# Patient Record
Sex: MALE | Race: WHITE | HISPANIC OR LATINO | Employment: FULL TIME | URBAN - METROPOLITAN AREA
[De-identification: names, ages, dates, MRNs, and addresses within clinical notes are randomized per-mention and may not be internally consistent; named-entity substitution may affect disease eponyms.]

---

## 2017-11-23 ENCOUNTER — HOSPITAL ENCOUNTER (INPATIENT)
Facility: MEDICAL CENTER | Age: 22
LOS: 2 days | DRG: 340 | End: 2017-11-25
Attending: EMERGENCY MEDICINE | Admitting: SURGERY

## 2017-11-23 ENCOUNTER — APPOINTMENT (OUTPATIENT)
Dept: RADIOLOGY | Facility: MEDICAL CENTER | Age: 22
DRG: 340 | End: 2017-11-23
Attending: EMERGENCY MEDICINE

## 2017-11-23 DIAGNOSIS — K35.30 ACUTE APPENDICITIS WITH LOCALIZED PERITONITIS: ICD-10-CM

## 2017-11-23 PROBLEM — K35.80 APPENDICITIS, ACUTE: Status: ACTIVE | Noted: 2017-11-23

## 2017-11-23 LAB
ALBUMIN SERPL BCP-MCNC: 4.8 G/DL (ref 3.2–4.9)
ALBUMIN/GLOB SERPL: 1.9 G/DL
ALP SERPL-CCNC: 71 U/L (ref 30–99)
ALT SERPL-CCNC: 65 U/L (ref 2–50)
ANION GAP SERPL CALC-SCNC: 7 MMOL/L (ref 0–11.9)
APPEARANCE UR: CLEAR
AST SERPL-CCNC: 31 U/L (ref 12–45)
BASOPHILS # BLD AUTO: 0.4 % (ref 0–1.8)
BASOPHILS # BLD: 0.07 K/UL (ref 0–0.12)
BILIRUB SERPL-MCNC: 0.8 MG/DL (ref 0.1–1.5)
BILIRUB UR QL STRIP.AUTO: NEGATIVE
BUN SERPL-MCNC: 13 MG/DL (ref 8–22)
CALCIUM SERPL-MCNC: 9.3 MG/DL (ref 8.4–10.2)
CHLORIDE SERPL-SCNC: 102 MMOL/L (ref 96–112)
CO2 SERPL-SCNC: 28 MMOL/L (ref 20–33)
COLOR UR: YELLOW
CREAT SERPL-MCNC: 0.94 MG/DL (ref 0.5–1.4)
CULTURE IF INDICATED INDCX: NO UA CULTURE
EOSINOPHIL # BLD AUTO: 0.03 K/UL (ref 0–0.51)
EOSINOPHIL NFR BLD: 0.2 % (ref 0–6.9)
ERYTHROCYTE [DISTWIDTH] IN BLOOD BY AUTOMATED COUNT: 38.6 FL (ref 35.9–50)
GFR SERPL CREATININE-BSD FRML MDRD: >60 ML/MIN/1.73 M 2
GLOBULIN SER CALC-MCNC: 2.5 G/DL (ref 1.9–3.5)
GLUCOSE SERPL-MCNC: 101 MG/DL (ref 65–99)
GLUCOSE UR STRIP.AUTO-MCNC: NEGATIVE MG/DL
HCT VFR BLD AUTO: 52.1 % (ref 42–52)
HGB BLD-MCNC: 18 G/DL (ref 14–18)
IMM GRANULOCYTES # BLD AUTO: 0.1 K/UL (ref 0–0.11)
IMM GRANULOCYTES NFR BLD AUTO: 0.6 % (ref 0–0.9)
KETONES UR STRIP.AUTO-MCNC: NEGATIVE MG/DL
LEUKOCYTE ESTERASE UR QL STRIP.AUTO: NEGATIVE
LIPASE SERPL-CCNC: 21 U/L (ref 7–58)
LYMPHOCYTES # BLD AUTO: 1.38 K/UL (ref 1–4.8)
LYMPHOCYTES NFR BLD: 7.9 % (ref 22–41)
MCH RBC QN AUTO: 29.8 PG (ref 27–33)
MCHC RBC AUTO-ENTMCNC: 34.5 G/DL (ref 33.7–35.3)
MCV RBC AUTO: 86.3 FL (ref 81.4–97.8)
MICRO URNS: NORMAL
MONOCYTES # BLD AUTO: 0.71 K/UL (ref 0–0.85)
MONOCYTES NFR BLD AUTO: 4 % (ref 0–13.4)
NEUTROPHILS # BLD AUTO: 15.26 K/UL (ref 1.82–7.42)
NEUTROPHILS NFR BLD: 86.9 % (ref 44–72)
NITRITE UR QL STRIP.AUTO: NEGATIVE
NRBC # BLD AUTO: 0 K/UL
NRBC BLD AUTO-RTO: 0 /100 WBC
PH UR STRIP.AUTO: 8 [PH]
PLATELET # BLD AUTO: 266 K/UL (ref 164–446)
PMV BLD AUTO: 8.7 FL (ref 9–12.9)
POTASSIUM SERPL-SCNC: 4.5 MMOL/L (ref 3.6–5.5)
PROT SERPL-MCNC: 7.3 G/DL (ref 6–8.2)
PROT UR QL STRIP: NEGATIVE MG/DL
RBC # BLD AUTO: 6.04 M/UL (ref 4.7–6.1)
RBC UR QL AUTO: NEGATIVE
SODIUM SERPL-SCNC: 137 MMOL/L (ref 135–145)
SP GR UR STRIP.AUTO: 1.01
WBC # BLD AUTO: 17.6 K/UL (ref 4.8–10.8)

## 2017-11-23 PROCEDURE — 700111 HCHG RX REV CODE 636 W/ 250 OVERRIDE (IP)

## 2017-11-23 PROCEDURE — 74177 CT ABD & PELVIS W/CONTRAST: CPT

## 2017-11-23 PROCEDURE — 80053 COMPREHEN METABOLIC PANEL: CPT

## 2017-11-23 PROCEDURE — 96365 THER/PROPH/DIAG IV INF INIT: CPT

## 2017-11-23 PROCEDURE — 700102 HCHG RX REV CODE 250 W/ 637 OVERRIDE(OP): Performed by: SURGERY

## 2017-11-23 PROCEDURE — 770006 HCHG ROOM/CARE - MED/SURG/GYN SEMI*

## 2017-11-23 PROCEDURE — 81003 URINALYSIS AUTO W/O SCOPE: CPT

## 2017-11-23 PROCEDURE — 700117 HCHG RX CONTRAST REV CODE 255: Performed by: EMERGENCY MEDICINE

## 2017-11-23 PROCEDURE — 700105 HCHG RX REV CODE 258: Performed by: EMERGENCY MEDICINE

## 2017-11-23 PROCEDURE — 700105 HCHG RX REV CODE 258

## 2017-11-23 PROCEDURE — 700101 HCHG RX REV CODE 250: Performed by: SURGERY

## 2017-11-23 PROCEDURE — 99285 EMERGENCY DEPT VISIT HI MDM: CPT

## 2017-11-23 PROCEDURE — 83690 ASSAY OF LIPASE: CPT

## 2017-11-23 PROCEDURE — 36415 COLL VENOUS BLD VENIPUNCTURE: CPT

## 2017-11-23 PROCEDURE — A9270 NON-COVERED ITEM OR SERVICE: HCPCS | Performed by: SURGERY

## 2017-11-23 PROCEDURE — 85025 COMPLETE CBC W/AUTO DIFF WBC: CPT

## 2017-11-23 RX ORDER — AMOXICILLIN 250 MG
2 CAPSULE ORAL 2 TIMES DAILY
Status: DISCONTINUED | OUTPATIENT
Start: 2017-11-23 | End: 2017-11-24

## 2017-11-23 RX ORDER — ACETAMINOPHEN 325 MG/1
650 TABLET ORAL EVERY 6 HOURS PRN
Status: DISCONTINUED | OUTPATIENT
Start: 2017-11-23 | End: 2017-11-25 | Stop reason: HOSPADM

## 2017-11-23 RX ORDER — ONDANSETRON 2 MG/ML
4 INJECTION INTRAMUSCULAR; INTRAVENOUS EVERY 4 HOURS PRN
Status: DISCONTINUED | OUTPATIENT
Start: 2017-11-23 | End: 2017-11-25 | Stop reason: HOSPADM

## 2017-11-23 RX ORDER — ONDANSETRON 4 MG/1
4 TABLET, ORALLY DISINTEGRATING ORAL EVERY 4 HOURS PRN
Status: DISCONTINUED | OUTPATIENT
Start: 2017-11-23 | End: 2017-11-25 | Stop reason: HOSPADM

## 2017-11-23 RX ORDER — OXYCODONE HYDROCHLORIDE 5 MG/1
5 TABLET ORAL
Status: DISCONTINUED | OUTPATIENT
Start: 2017-11-23 | End: 2017-11-25 | Stop reason: HOSPADM

## 2017-11-23 RX ORDER — PROMETHAZINE HYDROCHLORIDE 25 MG/1
12.5-25 TABLET ORAL EVERY 4 HOURS PRN
Status: DISCONTINUED | OUTPATIENT
Start: 2017-11-23 | End: 2017-11-24

## 2017-11-23 RX ORDER — POLYETHYLENE GLYCOL 3350 17 G/17G
1 POWDER, FOR SOLUTION ORAL
Status: DISCONTINUED | OUTPATIENT
Start: 2017-11-23 | End: 2017-11-24

## 2017-11-23 RX ORDER — BISACODYL 10 MG
10 SUPPOSITORY, RECTAL RECTAL
Status: DISCONTINUED | OUTPATIENT
Start: 2017-11-23 | End: 2017-11-24

## 2017-11-23 RX ORDER — MORPHINE SULFATE 4 MG/ML
2 INJECTION, SOLUTION INTRAMUSCULAR; INTRAVENOUS
Status: DISCONTINUED | OUTPATIENT
Start: 2017-11-23 | End: 2017-11-25 | Stop reason: HOSPADM

## 2017-11-23 RX ORDER — OXYCODONE HYDROCHLORIDE 5 MG/1
2.5 TABLET ORAL
Status: DISCONTINUED | OUTPATIENT
Start: 2017-11-23 | End: 2017-11-25 | Stop reason: HOSPADM

## 2017-11-23 RX ORDER — SODIUM CHLORIDE 9 MG/ML
1000 INJECTION, SOLUTION INTRAVENOUS ONCE
Status: COMPLETED | OUTPATIENT
Start: 2017-11-23 | End: 2017-11-23

## 2017-11-23 RX ORDER — SODIUM CHLORIDE AND POTASSIUM CHLORIDE 150; 900 MG/100ML; MG/100ML
INJECTION, SOLUTION INTRAVENOUS CONTINUOUS
Status: DISCONTINUED | OUTPATIENT
Start: 2017-11-23 | End: 2017-11-25 | Stop reason: HOSPADM

## 2017-11-23 RX ORDER — PROMETHAZINE HYDROCHLORIDE 25 MG/1
12.5-25 SUPPOSITORY RECTAL EVERY 4 HOURS PRN
Status: DISCONTINUED | OUTPATIENT
Start: 2017-11-23 | End: 2017-11-25 | Stop reason: HOSPADM

## 2017-11-23 RX ADMIN — PIPERACILLIN SODIUM AND TAZOBACTAM SODIUM 4.5 G: 4; .5 INJECTION, POWDER, FOR SOLUTION INTRAVENOUS at 22:41

## 2017-11-23 RX ADMIN — IOHEXOL 100 ML: 350 INJECTION, SOLUTION INTRAVENOUS at 22:18

## 2017-11-23 RX ADMIN — OXYCODONE HYDROCHLORIDE 5 MG: 5 TABLET ORAL at 23:38

## 2017-11-23 RX ADMIN — SODIUM CHLORIDE 1000 ML: 900 INJECTION, SOLUTION INTRAVENOUS at 22:15

## 2017-11-23 RX ADMIN — POTASSIUM CHLORIDE AND SODIUM CHLORIDE: 900; 150 INJECTION, SOLUTION INTRAVENOUS at 23:29

## 2017-11-23 ASSESSMENT — LIFESTYLE VARIABLES
EVER_SMOKED: NEVER
ALCOHOL_USE: NO

## 2017-11-23 ASSESSMENT — PAIN SCALES - GENERAL
PAINLEVEL_OUTOF10: 0
PAINLEVEL_OUTOF10: 4
PAINLEVEL_OUTOF10: 4

## 2017-11-23 ASSESSMENT — PATIENT HEALTH QUESTIONNAIRE - PHQ9
1. LITTLE INTEREST OR PLEASURE IN DOING THINGS: NOT AT ALL
SUM OF ALL RESPONSES TO PHQ QUESTIONS 1-9: 0
2. FEELING DOWN, DEPRESSED, IRRITABLE, OR HOPELESS: NOT AT ALL
SUM OF ALL RESPONSES TO PHQ9 QUESTIONS 1 AND 2: 0

## 2017-11-24 LAB — PATHOLOGY CONSULT NOTE: NORMAL

## 2017-11-24 PROCEDURE — A9270 NON-COVERED ITEM OR SERVICE: HCPCS | Performed by: SURGERY

## 2017-11-24 PROCEDURE — 160028 HCHG SURGERY MINUTES - 1ST 30 MINS LEVEL 3: Performed by: SURGERY

## 2017-11-24 PROCEDURE — 700102 HCHG RX REV CODE 250 W/ 637 OVERRIDE(OP): Performed by: SURGERY

## 2017-11-24 PROCEDURE — A6402 STERILE GAUZE <= 16 SQ IN: HCPCS | Performed by: SURGERY

## 2017-11-24 PROCEDURE — 160009 HCHG ANES TIME/MIN: Performed by: SURGERY

## 2017-11-24 PROCEDURE — 500002 HCHG ADHESIVE, DERMABOND: Performed by: SURGERY

## 2017-11-24 PROCEDURE — 700102 HCHG RX REV CODE 250 W/ 637 OVERRIDE(OP)

## 2017-11-24 PROCEDURE — 0DTJ4ZZ RESECTION OF APPENDIX, PERCUTANEOUS ENDOSCOPIC APPROACH: ICD-10-PCS | Performed by: SURGERY

## 2017-11-24 PROCEDURE — A4314 CATH W/DRAINAGE 2-WAY LATEX: HCPCS | Performed by: SURGERY

## 2017-11-24 PROCEDURE — 160048 HCHG OR STATISTICAL LEVEL 1-5: Performed by: SURGERY

## 2017-11-24 PROCEDURE — 501572 HCHG TROCAR, SHIELD OBTU 5X100: Performed by: SURGERY

## 2017-11-24 PROCEDURE — 700105 HCHG RX REV CODE 258: Performed by: SURGERY

## 2017-11-24 PROCEDURE — 160036 HCHG PACU - EA ADDL 30 MINS PHASE I: Performed by: SURGERY

## 2017-11-24 PROCEDURE — 700101 HCHG RX REV CODE 250

## 2017-11-24 PROCEDURE — 700111 HCHG RX REV CODE 636 W/ 250 OVERRIDE (IP)

## 2017-11-24 PROCEDURE — 502571 HCHG PACK, LAP CHOLE: Performed by: SURGERY

## 2017-11-24 PROCEDURE — 501583 HCHG TROCAR, THRD CAN&SEAL 5X100: Performed by: SURGERY

## 2017-11-24 PROCEDURE — 501399 HCHG SPECIMAN BAG, ENDO CATC: Performed by: SURGERY

## 2017-11-24 PROCEDURE — 700101 HCHG RX REV CODE 250: Performed by: SURGERY

## 2017-11-24 PROCEDURE — 501838 HCHG SUTURE GENERAL: Performed by: SURGERY

## 2017-11-24 PROCEDURE — 160035 HCHG PACU - 1ST 60 MINS PHASE I: Performed by: SURGERY

## 2017-11-24 PROCEDURE — A9270 NON-COVERED ITEM OR SERVICE: HCPCS

## 2017-11-24 PROCEDURE — 160039 HCHG SURGERY MINUTES - EA ADDL 1 MIN LEVEL 3: Performed by: SURGERY

## 2017-11-24 PROCEDURE — 88304 TISSUE EXAM BY PATHOLOGIST: CPT

## 2017-11-24 PROCEDURE — 501568 HCHG TROCAR, BLUNTPORT 12MM: Performed by: SURGERY

## 2017-11-24 PROCEDURE — 770006 HCHG ROOM/CARE - MED/SURG/GYN SEMI*

## 2017-11-24 PROCEDURE — 500800 HCHG LAPAROSCOPIC J/L HOOK: Performed by: SURGERY

## 2017-11-24 PROCEDURE — 160002 HCHG RECOVERY MINUTES (STAT): Performed by: SURGERY

## 2017-11-24 PROCEDURE — 700111 HCHG RX REV CODE 636 W/ 250 OVERRIDE (IP): Performed by: SURGERY

## 2017-11-24 PROCEDURE — 94760 N-INVAS EAR/PLS OXIMETRY 1: CPT

## 2017-11-24 PROCEDURE — 501576 HCHG TROCAR, SMTH CAN&SEAL12: Performed by: SURGERY

## 2017-11-24 RX ORDER — PROMETHAZINE HYDROCHLORIDE 25 MG/1
12.5-25 TABLET ORAL EVERY 4 HOURS PRN
Status: DISCONTINUED | OUTPATIENT
Start: 2017-11-24 | End: 2017-11-25 | Stop reason: HOSPADM

## 2017-11-24 RX ORDER — OXYCODONE HCL 5 MG/5 ML
SOLUTION, ORAL ORAL
Status: COMPLETED
Start: 2017-11-24 | End: 2017-11-24

## 2017-11-24 RX ORDER — DIPHENHYDRAMINE HCL 25 MG
25 TABLET ORAL EVERY 6 HOURS PRN
Status: DISCONTINUED | OUTPATIENT
Start: 2017-11-24 | End: 2017-11-25 | Stop reason: HOSPADM

## 2017-11-24 RX ORDER — POLYETHYLENE GLYCOL 3350 17 G/17G
1 POWDER, FOR SOLUTION ORAL
Status: DISCONTINUED | OUTPATIENT
Start: 2017-11-24 | End: 2017-11-25 | Stop reason: HOSPADM

## 2017-11-24 RX ORDER — SODIUM CHLORIDE, SODIUM LACTATE, POTASSIUM CHLORIDE, CALCIUM CHLORIDE 600; 310; 30; 20 MG/100ML; MG/100ML; MG/100ML; MG/100ML
INJECTION, SOLUTION INTRAVENOUS
Status: DISCONTINUED | OUTPATIENT
Start: 2017-11-24 | End: 2017-11-25 | Stop reason: HOSPADM

## 2017-11-24 RX ORDER — BISACODYL 10 MG
10 SUPPOSITORY, RECTAL RECTAL
Status: DISCONTINUED | OUTPATIENT
Start: 2017-11-24 | End: 2017-11-25 | Stop reason: HOSPADM

## 2017-11-24 RX ORDER — BUPIVACAINE HYDROCHLORIDE AND EPINEPHRINE 5; 5 MG/ML; UG/ML
INJECTION, SOLUTION PERINEURAL
Status: DISCONTINUED | OUTPATIENT
Start: 2017-11-24 | End: 2017-11-24 | Stop reason: HOSPADM

## 2017-11-24 RX ORDER — AMOXICILLIN 250 MG
2 CAPSULE ORAL 2 TIMES DAILY
Status: DISCONTINUED | OUTPATIENT
Start: 2017-11-24 | End: 2017-11-25 | Stop reason: HOSPADM

## 2017-11-24 RX ORDER — MEPERIDINE HYDROCHLORIDE 25 MG/ML
INJECTION INTRAMUSCULAR; INTRAVENOUS; SUBCUTANEOUS
Status: COMPLETED
Start: 2017-11-24 | End: 2017-11-24

## 2017-11-24 RX ORDER — DIPHENHYDRAMINE HYDROCHLORIDE 50 MG/ML
12.5-25 INJECTION INTRAMUSCULAR; INTRAVENOUS EVERY 6 HOURS PRN
Status: DISCONTINUED | OUTPATIENT
Start: 2017-11-24 | End: 2017-11-25 | Stop reason: HOSPADM

## 2017-11-24 RX ADMIN — MEPERIDINE HYDROCHLORIDE 12.5 MG: 25 INJECTION INTRAMUSCULAR; INTRAVENOUS; SUBCUTANEOUS at 09:45

## 2017-11-24 RX ADMIN — FENTANYL CITRATE 25 MCG: 50 INJECTION, SOLUTION INTRAMUSCULAR; INTRAVENOUS at 10:00

## 2017-11-24 RX ADMIN — SODIUM CHLORIDE, SODIUM LACTATE, POTASSIUM CHLORIDE, CALCIUM CHLORIDE: 600; 310; 30; 20 INJECTION, SOLUTION INTRAVENOUS at 08:35

## 2017-11-24 RX ADMIN — FENTANYL CITRATE 25 MCG: 50 INJECTION, SOLUTION INTRAMUSCULAR; INTRAVENOUS at 10:15

## 2017-11-24 RX ADMIN — OXYCODONE HYDROCHLORIDE 5 MG: 5 TABLET ORAL at 19:36

## 2017-11-24 RX ADMIN — PIPERACILLIN SODIUM AND TAZOBACTAM SODIUM 3.38 G: 3; .375 INJECTION, POWDER, FOR SOLUTION INTRAVENOUS at 21:28

## 2017-11-24 RX ADMIN — FENTANYL CITRATE 25 MCG: 50 INJECTION, SOLUTION INTRAMUSCULAR; INTRAVENOUS at 09:51

## 2017-11-24 RX ADMIN — POTASSIUM CHLORIDE AND SODIUM CHLORIDE: 900; 150 INJECTION, SOLUTION INTRAVENOUS at 21:28

## 2017-11-24 RX ADMIN — OXYCODONE HYDROCHLORIDE 5 MG: 5 TABLET ORAL at 15:51

## 2017-11-24 RX ADMIN — FENTANYL CITRATE 25 MCG: 50 INJECTION, SOLUTION INTRAMUSCULAR; INTRAVENOUS at 10:30

## 2017-11-24 RX ADMIN — OXYCODONE HYDROCHLORIDE 10 MG: 5 SOLUTION ORAL at 09:50

## 2017-11-24 RX ADMIN — POTASSIUM CHLORIDE AND SODIUM CHLORIDE: 900; 150 INJECTION, SOLUTION INTRAVENOUS at 06:19

## 2017-11-24 RX ADMIN — PIPERACILLIN SODIUM AND TAZOBACTAM SODIUM 3.38 G: 3; .375 INJECTION, POWDER, FOR SOLUTION INTRAVENOUS at 14:37

## 2017-11-24 RX ADMIN — OXYCODONE HYDROCHLORIDE 5 MG: 5 TABLET ORAL at 12:42

## 2017-11-24 RX ADMIN — PIPERACILLIN SODIUM AND TAZOBACTAM SODIUM 3.38 G: 3; .375 INJECTION, POWDER, FOR SOLUTION INTRAVENOUS at 11:30

## 2017-11-24 ASSESSMENT — COPD QUESTIONNAIRES
DO YOU EVER COUGH UP ANY MUCUS OR PHLEGM?: NO/ONLY WITH OCCASIONAL COLDS OR INFECTIONS
COPD SCREENING SCORE: 0
HAVE YOU SMOKED AT LEAST 100 CIGARETTES IN YOUR ENTIRE LIFE: NO/DON'T KNOW
DURING THE PAST 4 WEEKS HOW MUCH DID YOU FEEL SHORT OF BREATH: NONE/LITTLE OF THE TIME

## 2017-11-24 ASSESSMENT — PAIN SCALES - GENERAL
PAINLEVEL_OUTOF10: 5
PAINLEVEL_OUTOF10: 0
PAINLEVEL_OUTOF10: 5
PAINLEVEL_OUTOF10: 0
PAINLEVEL_OUTOF10: 4
PAINLEVEL_OUTOF10: 3
PAINLEVEL_OUTOF10: 0
PAINLEVEL_OUTOF10: 4

## 2017-11-24 ASSESSMENT — LIFESTYLE VARIABLES: EVER_SMOKED: NEVER

## 2017-11-24 NOTE — OP REPORT
DATE OF SERVICE:  11/24/2017    PREOPERATIVE DIAGNOSIS:  Acute appendicitis.    POSTOPERATIVE DIAGNOSIS:  Acute suppurative nonperforated appendicitis. Localized peritonitis    PROCEDURE PERFORMED:  Laparoscopic appendectomy.    SURGEON:  Kimberly Mayfield MD.    ANESTHESIOLOGIST:  Martin Mendosa MD.    ANESTHESIA:  GET.    FINDINGS:  Inflamed suppurative vermiform appendix.  No sign of perforation.    SPECIMENS:  Appendix.    ESTIMATED BLOOD LOSS:  10 mL.    COMPLICATIONS:  None.    PROCEDURE IN DETAIL:  Patient was consented preoperatively, taken to the   operating room and placed in the supine position.  Anesthesia was induced.  He   was endotracheally intubated without difficulty.  A Dover catheter was   placed.  His abdomen was prepped and draped, and a time-out was performed.  A   2 cm infraumbilical incision was made to place a 12 mm Naomi port under   direct visualization.  Abdomen was then insufflated to 12 mmHg.  A 5 mm   30-degree camera was then placed within the abdomen.  Two 5 mm ports were   placed, one in the suprapubic region, one in the epigastrium with care not to   injure the bladder.  The patient was then airplaned with his left side down.    The cecum was rotated medially exposing the vermiform appendix that was   inflamed and suppurative, but there was no sign of perforation.  The   mesoappendix was divided with the harmonic device exposing the base of the   appendix.  The base of the appendix was transected with an Endo-USHA stapler   using a blue load.  Staple line was inspected, it was intact.  There was no   sign of bleeding.  The appendix was placed in an EndoCatch bag and withdrawn   from the abdomen.  The right lower quadrant was irrigated out copiously until   the irrigant ran clear and hemostasis was confirmed.  The 5 mm ports were   removed under direct visualization.  There was no bleeding from the abdominal   wall.  The abdomen was then desufflated.  The infraumbilical fascia incision    was closed with a figure-of-eight using 0 Vicryl.  All skin incisions were   closed with a subcuticular stitch using 4-0 Monocryl.  Dry gauze dressings   were then applied.  All lap, sponge and instrument counts were correct at the   end of the case x2.  The patient tolerated the procedure well.  He was   awakened from anesthesia, extubated, taken to the postanesthesia care unit in   good condition.       ____________________________________     MD EMIL Davidson / VLADIMIR    DD:  11/24/2017 09:44:40  DT:  11/24/2017 09:56:17    D#:  [id number]  Job#:  [id number]

## 2017-11-24 NOTE — FLOWSHEET NOTE
11/24/17 1437   Interdisciplinary Plan of Care-Goals (Indications)   Hyperinflation Protocol Indications Pre or Post-op Abdominal, Thoracic or Orthopedic Surgery   Incentive Spirometry Group   Incentive Spirometry Instruction Yes   Incentive Spirometer Volume 3000 mL   Chest Exam   Respiration 16   Pulse 99   Breath Sounds   RUL Breath Sounds Clear   RML Breath Sounds Clear   RLL Breath Sounds Clear   QUIRINO Breath Sounds Clear   LLL Breath Sounds Clear   Oxygen   Home O2 Use Prior To Admission? No   Pulse Oximetry 96 %   O2 (LPM) 0   O2 Daily Delivery Respiratory  Room Air with O2 Available

## 2017-11-24 NOTE — OR NURSING
0835 PT TO PACU FOR PRE OP TO ASSUME CARE.  0842 Patient allergies and NPO status verified, home medication reconciliation completed and belongings secured. Patient verbalizes understanding of pain scale, expected course of stay and plan of care. Surgical site verified with patient. IVF established. Sequentials placed on legs

## 2017-11-24 NOTE — OR NURSING
0941-arrived PACU. Arouses to verbal stim. Spontaneous clear resp present as noted. O2@LNC.  VSS dsg CD&I to abd lap stabx3.  Mild red raised rash noted to upper chest area.  Dr. Roach made aware and states rash wash present prior to surgery.  0950- pt medicated for shivering and for pain. Tolerates po flds and oral meds w/o n/v.  Vss.  1000-MD to BS pt updated. O2 weaned to 1LNC. Tolerates well admits some pain relief. Sleeping intermittently.  VSS. Rash to chest remains unchanged. Medicated for c/o mild pain to lower abd.  1045-pt tolerates sips of water w/o n/v pain tolerable at present and dsg CDI. VSS.  Meets criteria for transfer. Report to Cheryl THEODORE.

## 2017-11-24 NOTE — H&P
DATE OF SERVICE:  11/24/2017    HISTORY OF PRESENT ILLNESS:  The patient is a 22-year-old male with 2-day   history of worsening periumbilical pain and nausea.  The patient has no   significant past medical history.  He does have associated diarrhea and   anorexia.  CT abdomen and pelvis showed a thickened appendiceal wall with   periappendiceal stranding, no extraluminal gas or fluid consistent with acute   appendicitis without perforation.    PAST SURGICAL HISTORY:  None.    MEDICATIONS:  None.    ALLERGIES:  No known drug allergies.    SOCIAL HISTORY:  Denies tobacco or illicit drugs, occasional alcohol.    SURGICAL HISTORY:  None.    FAMILY HISTORY:  No history of bleeding, diathesis, or problems with   anesthesia.    PHYSICAL EXAMINATION:  VITAL SIGNS:  Temperature 36.9, pulse 100, blood pressure 136/64, sats 99% on   room air.  GENERAL:  Alert and oriented x3, no apparent distress, nondiaphoretic.  CARDIOVASCULAR:  Regular rate and rhythm.  LUNGS:  Clear to auscultation.  ABDOMEN:  Patient's abdomen is nondistended, soft.  He has tenderness to   palpation in the right lower quadrant with localized rebound and guarding.  SKIN:  No petechia, erythema, or rashes.  NEUROLOGIC:  Cranial nerves II through XII grossly intact.  Normal sensation   and strength in bilateral upper and lower extremities.    LABORATORY DATA:  White count 17, hemoglobin 18, hematocrit 52, and platelets   266.  Sodium 137, potassium 4.5, chloride 102, bicarbonate 28, BUN 13,   creatinine 0.9, AST 31, ALT 65, and total bilirubin 0.8.    ASSESSMENT AND PLAN:  Patient is a 22-year-old male with a history, clinical   exam, and CT findings consistent with acute appendicitis.  Plan is for   laparoscopic appendectomy, possible open.  Risk of surgery were discussed with   the patient including postoperative infection, bleeding, injury to bowel,   possible need for open surgery, postoperative infection, and abscess.  Patient   stated he understands  the risks of surgery and wishes to proceed.       ____________________________________     MD EMIL Davidson / VLADIMIR    DD:  11/24/2017 08:28:54  DT:  11/24/2017 08:57:57    D#:  [id number]  Job#:  [id number]

## 2017-11-24 NOTE — PROGRESS NOTES
"Date & Time:   11/24/2017   8:40 AM        Patient ID:             Name:             Ede Kim     YOB: 1995  Age:                 22 y.o.  male   MRN:               [id number]    _______________    C/o RLQ pain  Acute Appendicitis on CT     Interval Exam:       Vitals:    11/23/17 2219 11/23/17 2237 11/23/17 2300 11/24/17 0800   BP:   136/64 (!) 99/70   Pulse: (!) 105 97 (!) 101 81   Resp:   18 18   Temp:   36.9 °C (98.4 °F) 36.8 °C (98.3 °F)   SpO2: 97% 97% 99% 95%   Weight:   78.6 kg (173 lb 4.5 oz)    Height:   1.753 m (5' 9.02\")      Weight/BMI: Body mass index is 25.58 kg/m².  Pulse Oximetry: 95 %, O2 (LPM): 0, O2 Delivery: None (Room Air)    Intake/Output Summary (Last 24 hours) at 11/24/17 0840  Last data filed at 11/24/17 0600   Gross per 24 hour   Intake              685 ml   Output             1450 ml   Net             -765 ml       Physical Exam  GENERAL:  Alert and oriented x 3. NAD, normal respiratory effort  CV: Regular rate and rhythm, no murmurs. Extremities warm no edema.   Lungs: Clear to auscultation bilaterally.    Abd: Soft, TTP RLQ +rebound    Recent Labs      11/23/17 2133   SODIUM  137   POTASSIUM  4.5   CHLORIDE  102   CO2  28   BUN  13   CREATININE  0.94   CALCIUM  9.3       Recent Labs      11/23/17 2133   ALTSGPT  65*   ASTSGOT  31   ALKPHOSPHAT  71   TBILIRUBIN  0.8   LIPASE  21   GLUCOSE  101*       Recent Labs      11/23/17 2133   RBC  6.04   HEMOGLOBIN  18.0   HEMATOCRIT  52.1*   PLATELETCT  266       Recent Labs      11/23/17 2133   WBC  17.6*   NEUTSPOLYS  86.90*   LYMPHOCYTES  7.90*   MONOCYTES  4.00   EOSINOPHILS  0.20   BASOPHILS  0.40   ASTSGOT  31   ALTSGPT  65*   ALKPHOSPHAT  71   TBILIRUBIN  0.8         ________________________________________________________________________     Current Assessment and Plan:    plan for laparoscopic Appendectomy, possible open  Indications and risks for surgery discussed with patient  Risk of bleeding, " infection, possible need for open surgery, injury to bowel  Patient stated he understands risks/indications for surgery and wishes to proceed.

## 2017-11-24 NOTE — ED PROVIDER NOTES
"ED Provider Note    CHIEF COMPLAINT  Chief Complaint   Patient presents with   • Pelvic Pain   • Painful Urination       HPI  Ede Kim is a 22 y.o. male who presents For evaluation of lower abdominal pain and nausea, going on for 2 days, no fever, no vomiting, he has had some diarrhea with this. He describes an increase in the lower abdominal pain with urination but no burning, no penile discharge, no testicular pain. He denies any recent sexual activity. He states he has no medical problems and at this time offers no other complaints.  He reports the pain is constant, non-radiating.     REVIEW OF SYSTEMS  Negative for fever, rash, chest pain, dyspnea, vomiting, diarrhea, headache, focal weakness, focal numbness, focal tingling, back pain. All other systems are negative.     PAST MEDICAL HISTORY  History reviewed. No pertinent past medical history.    FAMILY HISTORY  History reviewed. No pertinent family history.    SOCIAL HISTORY  Social History   Substance Use Topics   • Smoking status: Never Smoker   • Smokeless tobacco: Never Used   • Alcohol use Yes       SURGICAL HISTORY  History reviewed. No pertinent surgical history.    CURRENT MEDICATIONS  I personally reviewed the medication list in the charting documentation.     ALLERGIES  No Known Allergies    MEDICAL RECORD  I have reviewed patient's medical record and pertinent results are listed above.      PHYSICAL EXAM  VITAL SIGNS: /76   Pulse 90   Temp 36.9 °C (98.5 °F)   Resp 20   Ht 1.753 m (5' 9\")   Wt 78.6 kg (173 lb 4.5 oz)   SpO2 97%   BMI 25.59 kg/m²    Constitutional: Well appearing patient in no acute distress.  Not toxic, nor ill in appearance.  HENT: Mucus membranes moist.    Eyes: No scleral icterus. Normal conjunctiva   Neck: Supple, comfortable, nonpainful range of motion.   Cardiovascular: Regular heart rate and rhythm.   Thorax & Lungs: Chest is nontender.  Lungs are clear to auscultation with good air movement " bilaterally.  No wheeze, rhonchi, nor rales.   Abdomen: Soft,Nondistended, tenderness in the lower abdomen and periumbilically with generalized guarding.  Skin: Warm, dry. No rash appreciated  Extremities/Musculoskeletal: No sign of trauma. No asymmetric calf tenderness, erythema or edema. Normal range of motion   Neurologic: Alert & oriented. No focal deficits observed.   Psychiatric: Normal affect appropriate for the clinical situation.    DIAGNOSTIC STUDIES / PROCEDURES    LABS  Results for orders placed or performed during the hospital encounter of 11/23/17   URINALYSIS,CULTURE IF INDICATED   Result Value Ref Range    Color Yellow     Character Clear     Specific Gravity 1.015 <1.035    Ph 8.0 5.0 - 8.0    Glucose Negative Negative mg/dL    Ketones Negative Negative mg/dL    Protein Negative Negative mg/dL    Bilirubin Negative Negative    Nitrite Negative Negative    Leukocyte Esterase Negative Negative    Occult Blood Negative Negative    Micro Urine Req see below     Culture Indicated No UA Culture   COMP METABOLIC PANEL   Result Value Ref Range    Sodium 137 135 - 145 mmol/L    Potassium 4.5 3.6 - 5.5 mmol/L    Chloride 102 96 - 112 mmol/L    Co2 28 20 - 33 mmol/L    Anion Gap 7.0 0.0 - 11.9    Glucose 101 (H) 65 - 99 mg/dL    Bun 13 8 - 22 mg/dL    Creatinine 0.94 0.50 - 1.40 mg/dL    Calcium 9.3 8.4 - 10.2 mg/dL    AST(SGOT) 31 12 - 45 U/L    ALT(SGPT) 65 (H) 2 - 50 U/L    Alkaline Phosphatase 71 30 - 99 U/L    Total Bilirubin 0.8 0.1 - 1.5 mg/dL    Albumin 4.8 3.2 - 4.9 g/dL    Total Protein 7.3 6.0 - 8.2 g/dL    Globulin 2.5 1.9 - 3.5 g/dL    A-G Ratio 1.9 g/dL   CBC WITH DIFFERENTIAL   Result Value Ref Range    WBC 17.6 (H) 4.8 - 10.8 K/uL    RBC 6.04 4.70 - 6.10 M/uL    Hemoglobin 18.0 14.0 - 18.0 g/dL    Hematocrit 52.1 (H) 42.0 - 52.0 %    MCV 86.3 81.4 - 97.8 fL    MCH 29.8 27.0 - 33.0 pg    MCHC 34.5 33.7 - 35.3 g/dL    RDW 38.6 35.9 - 50.0 fL    Platelet Count 266 164 - 446 K/uL    MPV 8.7 (L) 9.0  - 12.9 fL    Neutrophils-Polys 86.90 (H) 44.00 - 72.00 %    Lymphocytes 7.90 (L) 22.00 - 41.00 %    Monocytes 4.00 0.00 - 13.40 %    Eosinophils 0.20 0.00 - 6.90 %    Basophils 0.40 0.00 - 1.80 %    Immature Granulocytes 0.60 0.00 - 0.90 %    Nucleated RBC 0.00 /100 WBC    Neutrophils (Absolute) 15.26 (H) 1.82 - 7.42 K/uL    Lymphs (Absolute) 1.38 1.00 - 4.80 K/uL    Monos (Absolute) 0.71 0.00 - 0.85 K/uL    Eos (Absolute) 0.03 0.00 - 0.51 K/uL    Baso (Absolute) 0.07 0.00 - 0.12 K/uL    Immature Granulocytes (abs) 0.10 0.00 - 0.11 K/uL    NRBC (Absolute) 0.00 K/uL   LIPASE   Result Value Ref Range    Lipase 21 7 - 58 U/L   ESTIMATED GFR   Result Value Ref Range    GFR If African American >60 >60 mL/min/1.73 m 2    GFR If Non African American >60 >60 mL/min/1.73 m 2           CT-ABDOMEN-PELVIS WITH   Final Result      Acute appendicitis without evidence for rupture and there is no abscess            COURSE & MEDICAL DECISION MAKING  I have reviewed any medical record information, laboratory studies and radiographic results as noted above.    Encounter Summary: This is a 22 y.o. male with abdominal pain, specifically suprapubic, worse with urination but no dysuria or hematuria, no testicular pain, no penile discharge. His vital signs reveal minimal hypertension otherwise within normal limits. On exam he has a tender abdomen with increased tenderness periumbilically and evidence of peritonitis. Initial urinalysis is normal. We'll check blood work. ------ blood work revealed leukocytosis, he remains tender and a CT scan will be obtained to rule out appendicitis ------ CT scan reveals acute appendicitis without any evidence of rupture or abscess, I spoke to Dr. Mayfield, general surgeon who is admitted the patient. I will give a dose of Zosyn.      DISPOSITION: Admitted in guarded condition      FINAL IMPRESSION  1. Acute appendicitis with localized peritonitis           This dictation was created using voice recognition  software. The accuracy of the dictation is limited to the abilities of the software. I expect there may be some errors of grammar and possibly content. The nursing notes were reviewed and certain aspects of this information were incorporated into this note.    Electronically signed by: Abhilash Pink, 11/23/2017 9:12 PM

## 2017-11-24 NOTE — OR SURGEON
Immediate Post OP Note    PreOp Diagnosis: Acute Appendicitis    PostOp Diagnosis: Acute Appendicitis    Procedure(s):  APPENDECTOMY LAPAROSCOPIC - Wound Class: Clean Contaminated    Surgeon(s):  Kimberly Mayfield M.D.    Anesthesiologist/Type of Anesthesia:  Anesthesiologist: Martin Mednosa M.D.  Anesthesia Technician: Emmanuel Malloy/General    Surgical Staff:  Circulator: Gisell Mason R.N.  Scrub Person: Nettie Rothman    Specimens:  Appendix    Estimated Blood Loss: 10cc    Findings: inflamed, suppurative appendix. No sign of perforation    Complications: none    Dictated #[id number]      11/24/2017 9:41 AM Kimberly Mayfield

## 2017-11-25 VITALS
TEMPERATURE: 98.4 F | HEIGHT: 69 IN | SYSTOLIC BLOOD PRESSURE: 123 MMHG | HEART RATE: 73 BPM | DIASTOLIC BLOOD PRESSURE: 58 MMHG | BODY MASS INDEX: 25.67 KG/M2 | RESPIRATION RATE: 16 BRPM | WEIGHT: 173.28 LBS | OXYGEN SATURATION: 97 %

## 2017-11-25 LAB
ERYTHROCYTE [DISTWIDTH] IN BLOOD BY AUTOMATED COUNT: 39.2 FL (ref 35.9–50)
HCT VFR BLD AUTO: 42.1 % (ref 42–52)
HGB BLD-MCNC: 14.5 G/DL (ref 14–18)
MCH RBC QN AUTO: 30 PG (ref 27–33)
MCHC RBC AUTO-ENTMCNC: 34.4 G/DL (ref 33.7–35.3)
MCV RBC AUTO: 87.2 FL (ref 81.4–97.8)
PLATELET # BLD AUTO: 214 K/UL (ref 164–446)
PMV BLD AUTO: 8.8 FL (ref 9–12.9)
RBC # BLD AUTO: 4.83 M/UL (ref 4.7–6.1)
WBC # BLD AUTO: 9.6 K/UL (ref 4.8–10.8)

## 2017-11-25 PROCEDURE — 700105 HCHG RX REV CODE 258: Performed by: SURGERY

## 2017-11-25 PROCEDURE — A9270 NON-COVERED ITEM OR SERVICE: HCPCS | Performed by: SURGERY

## 2017-11-25 PROCEDURE — 700101 HCHG RX REV CODE 250

## 2017-11-25 PROCEDURE — 700111 HCHG RX REV CODE 636 W/ 250 OVERRIDE (IP)

## 2017-11-25 PROCEDURE — 36415 COLL VENOUS BLD VENIPUNCTURE: CPT

## 2017-11-25 PROCEDURE — 700102 HCHG RX REV CODE 250 W/ 637 OVERRIDE(OP): Performed by: SURGERY

## 2017-11-25 PROCEDURE — 85027 COMPLETE CBC AUTOMATED: CPT

## 2017-11-25 PROCEDURE — 700111 HCHG RX REV CODE 636 W/ 250 OVERRIDE (IP): Performed by: SURGERY

## 2017-11-25 RX ORDER — HYDROCODONE BITARTRATE AND ACETAMINOPHEN 7.5; 325 MG/1; MG/1
1-2 TABLET ORAL EVERY 6 HOURS PRN
Qty: 30 TAB | Refills: 0 | Status: SHIPPED | OUTPATIENT
Start: 2017-11-25

## 2017-11-25 RX ORDER — ONDANSETRON 4 MG/1
4 TABLET, ORALLY DISINTEGRATING ORAL EVERY 4 HOURS PRN
Qty: 10 TAB | Refills: 1 | Status: SHIPPED | OUTPATIENT
Start: 2017-11-25 | End: 2019-01-24

## 2017-11-25 RX ADMIN — PIPERACILLIN SODIUM AND TAZOBACTAM SODIUM 3.38 G: 3; .375 INJECTION, POWDER, FOR SOLUTION INTRAVENOUS at 04:54

## 2017-11-25 RX ADMIN — OXYCODONE HYDROCHLORIDE 5 MG: 5 TABLET ORAL at 03:50

## 2017-11-25 ASSESSMENT — ENCOUNTER SYMPTOMS
NAUSEA: 0
FEVER: 0
VOMITING: 0
CHILLS: 0

## 2017-11-25 ASSESSMENT — PAIN SCALES - GENERAL
PAINLEVEL_OUTOF10: 0
PAINLEVEL_OUTOF10: 7
PAINLEVEL_OUTOF10: 5

## 2017-11-25 NOTE — PROGRESS NOTES
Pt returned from having a lap appy.pt aa&o x 4.  3 lap stab sites with drsgs cdi.abd tender.hypo bs.  Iv infusing.  Started on clear liqs.

## 2017-11-25 NOTE — PROGRESS NOTES
Surgical Progress Note    Author: Jeane Castro Date & Time created: 2017   7:38 AM     Interval Events:  Doing well. Pain adequately controlled. Feeling hungry, tolerating clear liquids.   Review of Systems   Constitutional: Negative for chills and fever.   Gastrointestinal: Negative for nausea and vomiting.     Hemodynamics:  Temp (24hrs), Av.5 °C (97.7 °F), Min:36.2 °C (97.2 °F), Max:37.2 °C (98.9 °F)  Temperature: 36.2 °C (97.2 °F)  Pulse  Av  Min: 81  Max: 105Heart Rate (Monitored): 90  Blood Pressure: 107/46, NIBP: 110/61     Respiratory:    Respiration: 16, Pulse Oximetry: 97 %, O2 Daily Delivery Respiratory : Room Air with O2 Available        RUL Breath Sounds: Clear, RML Breath Sounds: Clear, RLL Breath Sounds: Clear, QUIRINO Breath Sounds: Clear, LLL Breath Sounds: Clear  Neuro:  GCS       Fluids:    Intake/Output Summary (Last 24 hours) at 17 0738  Last data filed at 17 0600   Gross per 24 hour   Intake             2740 ml   Output             3575 ml   Net             -835 ml        Current Diet Order   Procedures   • Diet Order     Physical Exam  Labs:  Recent Results (from the past 24 hour(s))   HISTOLOGY REQUEST    Collection Time: 17  9:46 AM   Result Value Ref Range    Pathology Request Sent to [facility]    CBC without Differential    Collection Time: 17  4:36 AM   Result Value Ref Range    WBC 9.6 4.8 - 10.8 K/uL    RBC 4.83 4.70 - 6.10 M/uL    Hemoglobin 14.5 14.0 - 18.0 g/dL    Hematocrit 42.1 42.0 - 52.0 %    MCV 87.2 81.4 - 97.8 fL    MCH 30.0 27.0 - 33.0 pg    MCHC 34.4 33.7 - 35.3 g/dL    RDW 39.2 35.9 - 50.0 fL    Platelet Count 214 164 - 446 K/uL    MPV 8.8 (L) 9.0 - 12.9 fL     Medical Decision Making, by Problem:  Active Hospital Problems    Diagnosis   • Appendicitis, acute [K35.80]     Plan:  Leukocytosis resolved, afebrile.   Advance to regular diet.   Clinically doing well.   D/c home. Discussed post operative care instructions with patient.      Quality Measures:  Quality-Core Measures    Discussed patient condition with Patient

## 2017-11-25 NOTE — DISCHARGE SUMMARY
DATE OF ADMISSION:  11/24/2017    DATE OF DISCHARGE:  11/25/2017    ADMISSION DIAGNOSIS:  Acute appendicitis.    DISCHARGE DIAGNOSIS:  Acute appendicitis.    PROCEDURE:  Laparoscopic appendectomy on 11/24/2017.    HISTORY OF PRESENT ILLNESS:  This is a 22-year-old healthy young man who   presented with 2 days of increasing periumbilical pain and nausea.  He   underwent a CT scan in the [facility] emergency department and was found to have an   acutely inflamed appendix.  He underwent surgical therapy, which he tolerated   well.  He was kept overnight due to his significant leukocytosis of 17, which   resolved by the morning.  Overnight, he progressed to tolerating regular diet,   obtaining good pain control on p.o. pain medications, urinating without a   catheter and ambulating independently and was deemed appropriate for discharge   to home.    DISCHARGE PRESCRIPTIONS:  Zofran 4 mg by mouth every 4 hours as needed for   nausea #10 with 1 refill and Norco 7.5/325 one to two tabs by mouth every 6   hours as needed for pain, #30, no refills.    DISCHARGE INSTRUCTIONS:  He may shower, but should not use bath pools or hot   tubs for the next 2 weeks.  He should not lift more than 20 pounds for the   next 4 weeks.  If he has increasing pain, fevers, chills, nausea, vomiting,   shortness of breath, redness, or oozing around the incisions or any other   questions or concerns, he is to call our office or go to the emergency   department right away.  He is to follow up with Dr. Mayfield in 2 weeks.       ____________________________________     MD HIMANSHU Laws / VLADIMIR    DD:  11/25/2017 07:43:29  DT:  11/25/2017 08:18:10    D#:  [id number]  Job#:  [id number]

## 2017-11-25 NOTE — DISCHARGE INSTRUCTIONS
Laparoscopic Appendectomy D/C instructions:     1. DIET: Upon discharge from the hospital you may resume your normal pre-operative diet. Depending on how you are feeling and whether you have nausea or not, you may wish to stay with a bland diet for the first few days. However, you can advance this as quickly as you feel ready.     2. ACTIVITIES: After discharge from the hospital, you may resume full routine activities. However, there should be no heavy lifting (greater than 15 pounds) and no strenuous activities until after your follow-up visit. Otherwise, routine activities of daily living are acceptable.     3. DRIVING: You may drive whenever you are off pain medications and are able to perform the activities needed to drive, i.e. turning, bending, twisting, etc.     4. BATHING: You may get the wound wet at any time after leaving the hospital. You may shower, but do not submerge in a bath for at least a week. Dressings may come off after 48 hours.     5. BOWEL FUNCTION: A few patients, after this operation, will develop either frequent or loose stools after meals. This usually corrects itself after a few days, to a few weeks. If this occurs, do not worry; it is not unusual and will resolve. Much more common than loose stools, is constipation. The combination of pain medication and decreased activity level can cause constipation in otherwise normal patients. If you feel this is occurring, take a laxative (Milk of Magnesia, Ex-Lax, Senokot, etc.) until the problem has resolved.     6. PAIN MEDICATION: You will be given a prescription for pain medication at discharge. Please take these as directed. It is important to remember not to take medications on an empty stomach as this may cause nausea.     7. CALL IF YOU HAVE: (1) Fevers to more than 1010 F, (2) Unusual chest or leg pain, (3) Drainage or fluid from incision that may be foul smelling, increased tenderness or soreness at the wound or the wound edges are no  longer together, redness or swelling at the incision site. Please do not hesitate to call with any other questions.     8. APPOINTMENT: Contact our office at [phone] for a follow-up appointment in 1 to 2 weeks following your procedure.     If you have any additional questions, please do not hesitate to call the office and speak to either myself or the physician on call.     Office address:   [address]       Jeane Aviles MD   [facility]   [phone]  Discharge Instructions    Discharged to home by car with relative. Discharged via wheelchair, hospital escort: Yes.  Special equipment needed: Not Applicable    Be sure to schedule a follow-up appointment with your primary care doctor or any specialists as instructed.     Discharge Plan:   Influenza Vaccine Indication: Patient Refuses (Refuse)    I understand that a diet low in cholesterol, fat, and sodium is recommended for good health. Unless I have been given specific instructions below for another diet, I accept this instruction as my diet prescription.   Other diet: advance diet as tolerated    Special Instructions: None    · Is patient discharged on Warfarin / Coumadin?   No     · Is patient Post Blood Transfusion?  No    Depression / Suicide Risk    As you are discharged from this [facility] facility, it is important to learn how to keep safe from harming yourself.    Recognize the warning signs:  · Abrupt changes in personality, positive or negative- including increase in energy   · Giving away possessions  · Change in eating patterns- significant weight changes-  positive or negative  · Change in sleeping patterns- unable to sleep or sleeping all the time   · Unwillingness or inability to communicate  · Depression  · Unusual sadness, discouragement and loneliness  · Talk of wanting to die  · Neglect of personal appearance   · Rebelliousness- reckless behavior  · Withdrawal from people/activities they love  · Confusion-  inability to concentrate     If you or a loved one observes any of these behaviors or has concerns about self-harm, here's what you can do:  · Talk about it- your feelings and reasons for harming yourself  · Remove any means that you might use to hurt yourself (examples: pills, rope, extension cords, firearm)  · Get professional help from the community (Mental Health, Substance Abuse, psychological counseling)  · Do not be alone:Call your Safe Contact- someone whom you trust who will be there for you.  · Call your local CRISIS HOTLINE 847-1430 or 936-341-7092  · Call your local Children's Mobile Crisis Response Team [place] (333) 246-1478 or www.[employer]  · Call the toll free National Suicide Prevention Hotlines   · National Suicide Prevention Lifeline 513-740-XGUA (1646)  · National Hope Line Network 800-SUICIDE (419-0223)

## 2017-12-21 NOTE — DOCUMENTATION QUERY
"DOCUMENTATION QUERY    PROVIDERS: Please select “Cosign w/ note” to reply to query.    To better represent the severity of illness of your patient, please review the following information and exercise your independent professional judgment in responding to this query.     Acute appendicitis with localized peritonitis is documented in the ED Report. Acute appendicitis is documented throughout the chart however the localized peritonitis is not.   \"Acute suppurative nonperforated appendicitis\" is also documented.     Based upon the clinical findings, risk factors, and treatment, please clarify the type of Acute Appendicitis     Please select which applies:  • Acute appendicitis without peritonitis  • Acute appendicitis with localized peritonitis  • Acute appendicitis with generalized peritonitis  • Other explanation of clinical findings (please document)  • Unable to determine        The medical record reflects the following:   Clinical Findings   White count 17, hemoglobin 18, hematocrit 52, and platelets   266.  Sodium 137, potassium 4.5, chloride 102, bicarbonate 28, BUN 13, creatinine 0.9, AST 31, ALT 65, and total bilirubin 0.8.   Treatment  Laparoscopic appendectomy   Risk Factors     Location within medical record  Progress Notes   OP Report   Discharge Summary   ED Report     Thank you,   Elizabeth Sawallisch        "

## 2017-12-29 NOTE — DOCUMENTATION QUERY
"DOCUMENTATION QUERY     DR. DYSON, please review this Documentation Query and reply with an addendum. You co-signed only, which does not address the needed clarification from you to be able to code and complete this account.       To better represent the severity of illness of your patient, please review the following information and exercise your independent professional judgment in responding to this query.      Acute appendicitis with localized peritonitis is documented in the ED Report. Acute appendicitis is documented throughout the chart however the localized peritonitis is not.   \"Acute suppurative nonperforated appendicitis\" is also documented.      Based upon the clinical findings, risk factors, and treatment, please clarify the type of Acute Appendicitis                           Please select which applies:  · Acute appendicitis without peritonitis  · Acute appendicitis with localized peritonitis  · Acute appendicitis with generalized peritonitis  · Other explanation of clinical findings (please document)  · Unable to determine           The medical record reflects the following:   Clinical Findings   White count 17, hemoglobin 18, hematocrit 52, and platelets   266.  Sodium 137, potassium 4.5, chloride 102, bicarbonate 28, BUN 13, creatinine 0.9, AST 31, ALT 65, and total bilirubin 0.8.   Treatment  Laparoscopic appendectomy   Risk Factors     Location within medical record  Progress Notes   OP Report   Discharge Summary   ED Report      Thank you,   Elizabeth Sawallisch          "

## 2018-01-02 NOTE — ADDENDUM NOTE
Encounter addended by: Kaila Antony on: 1/2/2018  2:47 PM<BR>    Actions taken: Delete clinical note

## 2019-01-24 ENCOUNTER — HOSPITAL ENCOUNTER (EMERGENCY)
Facility: MEDICAL CENTER | Age: 24
End: 2019-01-24
Attending: EMERGENCY MEDICINE

## 2019-01-24 ENCOUNTER — APPOINTMENT (OUTPATIENT)
Dept: RADIOLOGY | Facility: MEDICAL CENTER | Age: 24
End: 2019-01-24
Attending: EMERGENCY MEDICINE

## 2019-01-24 VITALS
SYSTOLIC BLOOD PRESSURE: 116 MMHG | OXYGEN SATURATION: 97 % | RESPIRATION RATE: 16 BRPM | BODY MASS INDEX: 26.42 KG/M2 | DIASTOLIC BLOOD PRESSURE: 81 MMHG | HEIGHT: 69 IN | WEIGHT: 178.35 LBS | TEMPERATURE: 98.7 F | HEART RATE: 89 BPM

## 2019-01-24 DIAGNOSIS — R11.2 NAUSEA AND VOMITING, INTRACTABILITY OF VOMITING NOT SPECIFIED, UNSPECIFIED VOMITING TYPE: ICD-10-CM

## 2019-01-24 LAB
ALBUMIN SERPL BCP-MCNC: 5.2 G/DL (ref 3.2–4.9)
ALBUMIN/GLOB SERPL: 1.9 G/DL
ALP SERPL-CCNC: 85 U/L (ref 30–99)
ALT SERPL-CCNC: 158 U/L (ref 2–50)
ANION GAP SERPL CALC-SCNC: 11 MMOL/L (ref 0–11.9)
APAP SERPL-MCNC: <10 UG/ML (ref 10–30)
AST SERPL-CCNC: 91 U/L (ref 12–45)
BASOPHILS # BLD AUTO: 0.5 % (ref 0–1.8)
BASOPHILS # BLD: 0.04 K/UL (ref 0–0.12)
BILIRUB SERPL-MCNC: 1.1 MG/DL (ref 0.1–1.5)
BUN SERPL-MCNC: 11 MG/DL (ref 8–22)
CALCIUM SERPL-MCNC: 9.3 MG/DL (ref 8.4–10.2)
CHLORIDE SERPL-SCNC: 105 MMOL/L (ref 96–112)
CO2 SERPL-SCNC: 21 MMOL/L (ref 20–33)
CREAT SERPL-MCNC: 1.08 MG/DL (ref 0.5–1.4)
EOSINOPHIL # BLD AUTO: 0 K/UL (ref 0–0.51)
EOSINOPHIL NFR BLD: 0 % (ref 0–6.9)
ERYTHROCYTE [DISTWIDTH] IN BLOOD BY AUTOMATED COUNT: 38.2 FL (ref 35.9–50)
ETHANOL BLD-MCNC: 0 G/DL
GLOBULIN SER CALC-MCNC: 2.8 G/DL (ref 1.9–3.5)
GLUCOSE SERPL-MCNC: 105 MG/DL (ref 65–99)
HCT VFR BLD AUTO: 50.9 % (ref 42–52)
HGB BLD-MCNC: 17.5 G/DL (ref 14–18)
IMM GRANULOCYTES # BLD AUTO: 0.02 K/UL (ref 0–0.11)
IMM GRANULOCYTES NFR BLD AUTO: 0.2 % (ref 0–0.9)
LIPASE SERPL-CCNC: 26 U/L (ref 7–58)
LYMPHOCYTES # BLD AUTO: 1.37 K/UL (ref 1–4.8)
LYMPHOCYTES NFR BLD: 16.3 % (ref 22–41)
MCH RBC QN AUTO: 29.4 PG (ref 27–33)
MCHC RBC AUTO-ENTMCNC: 34.4 G/DL (ref 33.7–35.3)
MCV RBC AUTO: 85.5 FL (ref 81.4–97.8)
MONOCYTES # BLD AUTO: 0.33 K/UL (ref 0–0.85)
MONOCYTES NFR BLD AUTO: 3.9 % (ref 0–13.4)
NEUTROPHILS # BLD AUTO: 6.62 K/UL (ref 1.82–7.42)
NEUTROPHILS NFR BLD: 79.1 % (ref 44–72)
NRBC # BLD AUTO: 0 K/UL
NRBC BLD-RTO: 0 /100 WBC
PLATELET # BLD AUTO: 274 K/UL (ref 164–446)
PMV BLD AUTO: 8.5 FL (ref 9–12.9)
POTASSIUM SERPL-SCNC: 3.8 MMOL/L (ref 3.6–5.5)
PROT SERPL-MCNC: 8 G/DL (ref 6–8.2)
RBC # BLD AUTO: 5.95 M/UL (ref 4.7–6.1)
SODIUM SERPL-SCNC: 137 MMOL/L (ref 135–145)
WBC # BLD AUTO: 8.4 K/UL (ref 4.8–10.8)

## 2019-01-24 PROCEDURE — 99285 EMERGENCY DEPT VISIT HI MDM: CPT

## 2019-01-24 PROCEDURE — 83690 ASSAY OF LIPASE: CPT

## 2019-01-24 PROCEDURE — 80307 DRUG TEST PRSMV CHEM ANLYZR: CPT

## 2019-01-24 PROCEDURE — 80053 COMPREHEN METABOLIC PANEL: CPT

## 2019-01-24 PROCEDURE — 85025 COMPLETE CBC W/AUTO DIFF WBC: CPT

## 2019-01-24 PROCEDURE — 76705 ECHO EXAM OF ABDOMEN: CPT

## 2019-01-24 PROCEDURE — 36415 COLL VENOUS BLD VENIPUNCTURE: CPT

## 2019-01-24 RX ORDER — ONDANSETRON 4 MG/1
4 TABLET, ORALLY DISINTEGRATING ORAL EVERY 8 HOURS PRN
Qty: 10 TAB | Refills: 0 | Status: SHIPPED | OUTPATIENT
Start: 2019-01-24

## 2019-01-24 RX ORDER — OMEPRAZOLE 20 MG/1
20 CAPSULE, DELAYED RELEASE ORAL DAILY
Qty: 30 CAP | Refills: 0 | Status: SHIPPED | OUTPATIENT
Start: 2019-01-24

## 2019-01-24 NOTE — ED NOTES
Patient brought immediately back to room. ED Behavioral Health Patient Room Checklist completed by RN and ED tech. 1:1 observation in place.

## 2019-01-24 NOTE — DISCHARGE PLANNING
SW met with patient beside and provided this patient with the consoling /psych resource list.  SW assisted patient in calling [facility].  Patient completed the phone screening and was told that they would be contacting him to make an appointment. KATHARINE also provided this patient with the [facility] information.

## 2019-01-24 NOTE — CONSULTS
"[facility]   TRIAGE ASSESSMENT    Name: Ede Kim  MRN: [id number]  : 1995  Age: 23 y.o.  Date of assessment: 2019  PCP: Pcp Pt States None  Persons in attendance: Patient    CHIEF COMPLAINT/PRESENTING ISSUE (as stated by patient): 23 year old male patient BIB self for GI problem/vomiting and verbalized SI during [place] SSRS; pt alert, oriented x 4; calm; cooperative; organized thoughts and behaviors; no delusions, paranoia, hallucinations present; states he is having \"mental health issues, depression, for a lot of years and suicidal thoughts for 7 years, thoughts including lying in bed and getting a kitchen knife to stab self, take off seatbelt and crash car\" with a thought to stab self yesterday; denies plan today; denies h/o self-harmful, or suicidal behaviors; denies family h/o SA; denies current, or h/o oupt or inpt MH or CD treatment; states he is willing to accept help but he does not want anyone to know and he does not talk about his depression, SI with family or friends; states he will not act on SI, it is against his beliefs, \"suicide is not an option\"; denies current or h/o psych medications; denies  HI, h/o aggression, or arrests; current substance use includes ETOH (3 drinks, 3 x/week, last use 19 and acknowledges alcohol does not help decrease his depression; states he had 2 bottles of alcohol in the house and has gotten rid of them and does not have alcohol currently in the house; denies h/o 12 step mtgs; currently lives with 2 roommates; employed in [employer]; does not have insurance through is employer, cannot afford insurance, and makes too much money for Medicaid eligibility  (states he tried to obtain Medicaid a few months ago when looking at insurance plans); identifies positive support systems as his family, Jewish friends; positive coping skills include playing music (guitar, piano), exercising, playing basketball  At " "conclusion of assessment pt able to verbalize safety to self, denies current SI, and willing to initiate oupt MH services with community providers    Chief Complaint   Patient presents with   • N/V     x1 week.  Vomiting everything after eating past 2 days.   • Melena     x3 days   • Suicidal Ideation     Reports depression since 13 years of age.  Has thought of taking pills or driving car off dom[address].  Tearful in triage.        CURRENT LIVING SITUATION/SOCIAL SUPPORT: lives with 2 roommates; positive support systems as his family, [employer] friends    BEHAVIORAL HEALTH TREATMENT HISTORY  Does patient/parent report a history of prior behavioral health treatment for patient?   No:    SAFETY ASSESSMENT - SELF  Does patient acknowledge current or past symptoms of dangerousness to self? Yes- suicidal thoughts for 7 years, thoughts including lying in bed and getting a kitchen knife to stab self, take off seatbelt and crash car\" with a thought to stab self yesterday; denies plan today  Does parent/significant other report patient has current or past symptoms of dangerousness to self? N\A  Does presenting problem suggest symptoms of dangerousness to self? Yes:     Past Current    Suicidal Thoughts: [x]  [x]    Suicidal Plans: [x]  []    Suicidal Intent: []  []    Suicide Attempts: []  []    Self-Injury []  []      For any boxes checked above, provide detail: suicidal thoughts for 7 years, thoughts including lying in bed and getting a kitchen knife to stab self, take off seatbelt and crash car\" with a thought to stab self yesterday; denies plan today    History of suicide by family member: no  History of suicide by friend/significant other: no  Recent change in frequency/specificity/intensity of suicidal thoughts or self-harm behavior? no  Current access to firearms, medications, or other identified means of suicide/self-harm? yes - sharp knives  If yes, willing to restrict access to means of suicide/self-harm? yes - can lock up " "sharp kiives  Protective factors present:  Fear of suicide, Future-oriented, Good impulse control, Hopefulness, Moral objection to suicide, Optimism, Positive coping skills, Positive self-efficacy, Reasons for living identified by patient: willing to accept  help, Spiritual beliefs/practices, Strong family connections and Willing to address in treatment    SAFETY ASSESSMENT - OTHERS  Does patient acknowledge current or past symptoms of aggressive behavior or risk to others? no  Does parent/significant other report patient has current or past symptoms of aggressive behavior or risk to others?  N\A  Does presenting problem suggest symptoms of dangerousness to others? No    Crisis Safety Plan completed and copy given to patient? No-reviewed positive coping skills and supports verbally with pt    ABUSE/NEGLECT SCREENING  Does patient report feeling “unsafe” in his/her home, or afraid of anyone?  no  Does patient report any history of physical, sexual, or emotional abuse?  no  Does parent or significant other report any of the above? N\A  Is there evidence of neglect by self?  no  Is there evidence of neglect by a caregiver? no  Does the patient/parent report any history of CPS/APS/police involvement related to suspected abuse/neglect or domestic violence? no  Based on the information provided during the current assessment, is a mandated report of suspected abuse/neglect being made?  No    SUBSTANCE USE SCREENING  Yes:  [place] all substances used in the past 30 days:      Last Use Amount   [x]   Alcohol 1/21/19 3 drinks, 3 x week   []   Marijuana     []   Heroin     []   Prescription Opioids  (used without prescription, for    recreation, or in excess of prescribed amount)     []   Other Prescription  (used without prescription, for    recreation, or in excess of prescribed amount)     []   Cocaine      []   Methamphetamine     []   \"\" drugs (ectasy, MDMA)     []   Other substances        UDS results: not " collected  Breathalyzer results: 0.00    What consequences does the patient associate with any of the above substance use and or addictive behaviors? Other: depression    Risk factors for detox (check all that apply):  []  Seizures   []  Diaphoretic (sweating)   []  Tremors   []  Hallucinations   []  Increased blood pressure   []  Decreased blood pressure   []  Other   [x]  None      [] Patient education on risk factors for detoxification and instructed to return to ER as needed.      MENTAL STATUS   Participation: Active verbal participation  Grooming: Casual and Neat  Orientation: Alert and Fully Oriented  Behavior: Calm  Eye contact: Good  Mood: Depressed  Affect: Flexible and Full range  Thought process: Logical and Goal-directed  Thought content: Within normal limits  Speech: Rate within normal limits and Volume within normal limits  Perception: Within normal limits  Memory:  No gross evidence of memory deficits  Insight: Good  Judgment:  Good  Other:    Collateral information:   Source:  [] Significant other present in person:   [] Significant other by telephone  [] [facility]   [x] [facility] Nursing Staff  [] [facility]  [] Other:     [] Unable to complete full assessment due to:  [] Acute intoxication  [] Patient declined to participate/engage  [] Patient verbally unresponsive  [] Significant cognitive deficits  [] Significant perceptual distortions or behavioral disorganization  [] Other:      CLINICAL IMPRESSIONS:  Primary:  Depressed Mood  Secondary:         IDENTIFIED NEEDS/PLAN:  [Trigger DISPOSITION list for any items marked]    []  Imminent safety risk - self [] Imminent safety risk - others   []  Acute substance withdrawal []  Psychosis/Impaired reality testing   [x]  Mood/anxiety []  Substance use/Addictive behavior   []  Maladaptive behaviro []  Parent/child conflict   []  Family/Couples conflict []  Biomedical   []  Housing []  Financial   []   Legal  Occupational/Educational   []   Domestic violence []  Other:     Disposition: Refer to [facility], [facility], [facility] and [facility] [place]  cl[facility]; no active insurance plan; pt DC'd to self to f/u with community outpt  resources and referral info given; writer RN reviewed DC referral information with Preston Torres RN, Jesi, and Celina ALCANTAR, who reviewed DC instructions with pt; pt instructed to call [facility] [place] [facility] before DC from ED to complete telephone intact assessment and schedule his first appt; EWELINA Dennison assisting pt with this and appt date/time will be documented in pt's chart    Does patient express agreement with the above plan? yes    Referral appointment(s) scheduled? no    Alert team only:   I have discussed findings and recommendations with Dr. Padilla who is in agreement with these recommendations.     Referral information sent to the following community providers :[facility]    If applicable : Referred  to : [facility]HER for legal hold follow up at (time): ESTHER Falcon R.N.  1/24/2019

## 2019-01-24 NOTE — ED NOTES
in to provide and discuss outpatient follow up information. ERP and  agreed, pt ready for discharge. Patient provided printed discharge instructions which included signs and symptoms to look out for, why to return to ER, and other follow up appointment to make. Patient provided prescriptions, information on medications, and how to . Patient stated they understand discharge instructions and had no further questions or concerns at this time. Patient discharged to home by self. Patient ambulated out of ER with stable gait.

## 2019-01-24 NOTE — ED TRIAGE NOTES
Ede Kim 23 y.o. male     Chief Complaint   Patient presents with   • N/V     x1 week.  Vomiting everything after eating past 2 days.   • Melena     x3 days   • Suicidal Ideation     Reports depression since 13 years of age.  Has thought of taking pills or driving car off [address].  Tearful in triage.      High suicide risk on [place] Scale.  Charge RN notified.

## 2019-01-24 NOTE — DISCHARGE PLANNING
KATHARINE spoke with [facility] and was informed that their process is to complete and over the phone intake and then they will staff the new patient and then they will call the patient at home once assigned to a counselor.

## 2019-01-24 NOTE — ED PROVIDER NOTES
ED Provider Note    CHIEF COMPLAINT  Chief Complaint   Patient presents with   • N/V     x1 week.  Vomiting everything after eating past 2 days.   • Melena     x3 days   • Suicidal Ideation     Reports depression since 13 years of age.  Has thought of taking pills or driving car off dom[place].  Tearful in triage.       HPI  Ede Kim is a 23 y.o. male who presents to the emergency room with nausea and vomiting.  This is been a recurrent problem for the patient over several months.  Seems to come and go.  Nothing in particular aside from increased stress brings it on.  Over the last 1 week he has been vomiting after most meals.  Nonbloody nonbilious emesis.  He has had normal bowel movements up until 3 days ago when he had black stool.  3 episodes.  This was well formed black stool.  No diarrhea or loose stool.  He has associated upper abdominal discomfort.  Central epigastrium nonradiating constant.  No tearing pain or radiation to back.  No chest pain or shortness of breath.  He has not been taking aspirin or ibuprofen.  No anticoagulants.    The patient is self diagnosed with depression since age 13.  He states that he has had frequent thoughts of killing himself, but he would never do such a thing because he would not want to hurt any of his family.  The specific plan nor has he been thinking about suicide more recently.  He does have a history of alcohol use and did have a period where he was drinking heavily.  He is no longer drinking heavily.  He denies any illicit drugs.    REVIEW OF SYSTEMS  As per HPI, otherwise a 10 point review of systems is negative    PAST MEDICAL HISTORY  Past Medical History:   Diagnosis Date   • Depression        SOCIAL HISTORY  Social History   Substance Use Topics   • Smoking status: Never Smoker   • Smokeless tobacco: Never Used   • Alcohol use Yes      Comment: social       SURGICAL HISTORY  Past Surgical History:   Procedure Laterality Date   • APPENDECTOMY  "LAPAROSCOPIC N/A 11/24/2017    Procedure: APPENDECTOMY LAPAROSCOPIC;  Surgeon: Kimberly Mayfield M.D.;  Location: SURGERY [facility];  Service: Vascular       CURRENT MEDICATIONS  Home Medications    **Home medications have not yet been reviewed for this encounter**         ALLERGIES  No Known Allergies    PHYSICAL EXAM  VITAL SIGNS: /68   Pulse 100   Temp 37.7 °C (99.9 °F) (Temporal)   Resp 16   Ht 1.753 m (5' 9\")   Wt 80.9 kg (178 lb 5.6 oz)   SpO2 98%   BMI 26.34 kg/m²    Constitutional: Awake and alert  HENT:  Atraumatic, Normocephalic.Oropharynx moist mucus membranes, Nose normal inspection.   Eyes: Normal inspection  Neck: Supple  Cardiovascular: Normal heart rate, Normal rhythm.  Symmetric peripheral pulses.   Thorax & Lungs: No respiratory distress, No wheezing, No rales, No rhonchi, No chest tenderness.   Abdomen: Bowel sounds normal, soft, non-distended, nontender, no mass  Skin: Warm, Dry, No rash.   Back: No tenderness, No CVA tenderness.   Extremities: No clubbing, cyanosis, edema, no Homans or cords   Neurologic: Grossly normal   Psychiatric: Anxious appearing    RADIOLOGY/PROCEDURES  US-RUQ   Final Result      1.  Increased hepatic echotexture is consistent with fatty infiltration.      2.  No sonographic evidence of biliary obstruction.           Imaging is interpreted by radiologist    Labs:  Results for orders placed or performed during the hospital encounter of 01/24/19   CBC WITH DIFFERENTIAL   Result Value Ref Range    WBC 8.4 4.8 - 10.8 K/uL    RBC 5.95 4.70 - 6.10 M/uL    Hemoglobin 17.5 14.0 - 18.0 g/dL    Hematocrit 50.9 42.0 - 52.0 %    MCV 85.5 81.4 - 97.8 fL    MCH 29.4 27.0 - 33.0 pg    MCHC 34.4 33.7 - 35.3 g/dL    RDW 38.2 35.9 - 50.0 fL    Platelet Count 274 164 - 446 K/uL    MPV 8.5 (L) 9.0 - 12.9 fL    Neutrophils-Polys 79.10 (H) 44.00 - 72.00 %    Lymphocytes 16.30 (L) 22.00 - 41.00 %    Monocytes 3.90 0.00 - 13.40 %    Eosinophils 0.00 0.00 - 6.90 %    Basophils " 0.50 0.00 - 1.80 %    Immature Granulocytes 0.20 0.00 - 0.90 %    Nucleated RBC 0.00 /100 WBC    Neutrophils (Absolute) 6.62 1.82 - 7.42 K/uL    Lymphs (Absolute) 1.37 1.00 - 4.80 K/uL    Monos (Absolute) 0.33 0.00 - 0.85 K/uL    Eos (Absolute) 0.00 0.00 - 0.51 K/uL    Baso (Absolute) 0.04 0.00 - 0.12 K/uL    Immature Granulocytes (abs) 0.02 0.00 - 0.11 K/uL    NRBC (Absolute) 0.00 K/uL   COMP METABOLIC PANEL   Result Value Ref Range    Sodium 137 135 - 145 mmol/L    Potassium 3.8 3.6 - 5.5 mmol/L    Chloride 105 96 - 112 mmol/L    Co2 21 20 - 33 mmol/L    Anion Gap 11.0 0.0 - 11.9    Glucose 105 (H) 65 - 99 mg/dL    Bun 11 8 - 22 mg/dL    Creatinine 1.08 0.50 - 1.40 mg/dL    Calcium 9.3 8.4 - 10.2 mg/dL    AST(SGOT) 91 (H) 12 - 45 U/L    ALT(SGPT) 158 (H) 2 - 50 U/L    Alkaline Phosphatase 85 30 - 99 U/L    Total Bilirubin 1.1 0.1 - 1.5 mg/dL    Albumin 5.2 (H) 3.2 - 4.9 g/dL    Total Protein 8.0 6.0 - 8.2 g/dL    Globulin 2.8 1.9 - 3.5 g/dL    A-G Ratio 1.9 g/dL   LIPASE   Result Value Ref Range    Lipase 26 7 - 58 U/L   DIAGNOSTIC ALCOHOL   Result Value Ref Range    Diagnostic Alcohol 0.00 0.00 g/dL   ESTIMATED GFR   Result Value Ref Range    GFR If African American >60 >60 mL/min/1.73 m 2    GFR If Non African American >60 >60 mL/min/1.73 m 2   ACETAMINOPHEN   Result Value Ref Range    Acetaminophen -Tylenol <10 10 - 30 ug/mL       COURSE & MEDICAL DECISION MAKING  Patient presents with nausea and vomiting.  He has associated epigastric abdominal discomfort.  He has a nonsurgical abdominal examination.  His vital signs are because of the tenderness medical workup was initiated.  It is not a pancreatitis.  He did have mild elevation of the AST and ALT.  I obtained an ultrasound of the right upper quadrant.  He does have a fatty liver which could be the source of abnormal LFTs.  This will need to be rechecked.  He does not have suggestion of cholecystitis.  There is no leukocytosis.  He is not anemic.  No elevated  BUN.  No suggestion of ongoing blood loss.  I suspect his nausea and abdominal discomfort is in fact related to stress.  He could have gastritis or perhaps developing peptic ulcer disease.  He will be initiated on daily Prilosec.  I have given him a prescription for Zofran.  Reported a long-standing history of depression without any acute suicidality although always has some fleeting thoughts.  I consulted the alert team.  Please see the assessment.  Patient was felt to be appropriate for outpatient management.  The patient does not have a primary provider.  We consulted social work.  Appointment will be made at the [facility].  He will be given referrals for psychiatric counseling.  Patient should be rechecked by primary provider within 1 week.  I spoke with him and precautioned him to return the ER for any difficulty breathing, abdominal pain, uncontrolled vomiting, concerning symptoms.    Patient referred to primary provider for blood pressure management    FINAL IMPRESSION  1.  Epigastric abdominal pain, suspected gastritis  2.  Depression      This dictation was created using voice recognition software. The accuracy of the dictation is limited to the abilities of the software.  The nursing notes were reviewed and certain aspects of this information were incorporated into this note.      Electronically signed by: Manav aGrcia, 1/24/2019 3:01 PM

## 2020-01-14 NOTE — PROGRESS NOTES
Pt arrived from er a/o x 4 via gurney, accompanied by transport. Pt able to transfer from gurney to bed  in steady gait. Educated w/. Use of call light for needs. Will continue to monitor.   Exercise and NMR EXR  [x] (50524) Provided verbal/tactile cueing for activities related to strengthening, flexibility, endurance, ROM  for improvements in scapular, scapulothoracic and UE control with self care, reaching, carrying, lifting, house/yardwork, driving/computer work. [x] (83553) Provided verbal/tactile cueing for activities related to improving balance, coordination, kinesthetic sense, posture, motor skill, proprioception  to assist with  scapular, scapulothoracic and UE control with self care, reaching, carrying, lifting, house/yardwork, driving/computer work. Therapeutic Activities:    [x] (22146 or 38693) Provided verbal/tactile cueing for activities related to improving balance, coordination, kinesthetic sense, posture, motor skill, proprioception and motor activation to allow for proper function of scapular, scapulothoracic and UE control with self care, carrying, lifting, driving/computer work.      Home Exercise Program:    [x] (26009) Reviewed/Progressed HEP activities related to strengthening, flexibility, endurance, ROM of scapular, scapulothoracic and UE control with self care, reaching, carrying, lifting, house/yardwork, driving/computer work  [] (33369) Reviewed/Progressed HEP activities related to improving balance, coordination, kinesthetic sense, posture, motor skill, proprioception of scapular, scapulothoracic and UE control with self care, reaching, carrying, lifting, house/yardwork, driving/computer work      Manual Treatments:  PROM / STM / Oscillations-Mobs:  G-I, II, III, IV (PA's, Inf., Post.)  [x] (89249) Provided manual therapy to mobilize soft tissue/joints of cervical/CT, scapular GHJ and UE for the purpose of modulating pain, promoting relaxation,  increasing ROM, reducing/eliminating soft tissue swelling/inflammation/restriction, improving soft tissue extensibility and allowing for proper ROM for normal function with self care, reaching, carrying, lifting,

## 2025-02-18 SDOH — ECONOMIC STABILITY: INCOME INSECURITY: IN THE LAST 12 MONTHS, WAS THERE A TIME WHEN YOU WERE NOT ABLE TO PAY THE MORTGAGE OR RENT ON TIME?: NO

## 2025-02-18 SDOH — ECONOMIC STABILITY: INCOME INSECURITY: HOW HARD IS IT FOR YOU TO PAY FOR THE VERY BASICS LIKE FOOD, HOUSING, MEDICAL CARE, AND HEATING?: NOT VERY HARD

## 2025-02-18 SDOH — HEALTH STABILITY: PHYSICAL HEALTH: ON AVERAGE, HOW MANY DAYS PER WEEK DO YOU ENGAGE IN MODERATE TO STRENUOUS EXERCISE (LIKE A BRISK WALK)?: 3 DAYS

## 2025-02-18 SDOH — HEALTH STABILITY: PHYSICAL HEALTH: ON AVERAGE, HOW MANY MINUTES DO YOU ENGAGE IN EXERCISE AT THIS LEVEL?: 10 MIN

## 2025-02-18 SDOH — ECONOMIC STABILITY: FOOD INSECURITY: WITHIN THE PAST 12 MONTHS, YOU WORRIED THAT YOUR FOOD WOULD RUN OUT BEFORE YOU GOT MONEY TO BUY MORE.: NEVER TRUE

## 2025-02-18 SDOH — ECONOMIC STABILITY: FOOD INSECURITY: WITHIN THE PAST 12 MONTHS, THE FOOD YOU BOUGHT JUST DIDN'T LAST AND YOU DIDN'T HAVE MONEY TO GET MORE.: NEVER TRUE

## 2025-02-18 SDOH — HEALTH STABILITY: MENTAL HEALTH
STRESS IS WHEN SOMEONE FEELS TENSE, NERVOUS, ANXIOUS, OR CAN'T SLEEP AT NIGHT BECAUSE THEIR MIND IS TROUBLED. HOW STRESSED ARE YOU?: ONLY A LITTLE

## 2025-02-18 SDOH — ECONOMIC STABILITY: TRANSPORTATION INSECURITY
IN THE PAST 12 MONTHS, HAS LACK OF RELIABLE TRANSPORTATION KEPT YOU FROM MEDICAL APPOINTMENTS, MEETINGS, WORK OR FROM GETTING THINGS NEEDED FOR DAILY LIVING?: NO

## 2025-02-18 SDOH — ECONOMIC STABILITY: HOUSING INSECURITY
IN THE LAST 12 MONTHS, WAS THERE A TIME WHEN YOU DID NOT HAVE A STEADY PLACE TO SLEEP OR SLEPT IN A SHELTER (INCLUDING NOW)?: NO

## 2025-02-18 SDOH — ECONOMIC STABILITY: TRANSPORTATION INSECURITY
IN THE PAST 12 MONTHS, HAS THE LACK OF TRANSPORTATION KEPT YOU FROM MEDICAL APPOINTMENTS OR FROM GETTING MEDICATIONS?: NO

## 2025-02-18 SDOH — ECONOMIC STABILITY: TRANSPORTATION INSECURITY
IN THE PAST 12 MONTHS, HAS LACK OF TRANSPORTATION KEPT YOU FROM MEETINGS, WORK, OR FROM GETTING THINGS NEEDED FOR DAILY LIVING?: NO

## 2025-02-18 ASSESSMENT — SOCIAL DETERMINANTS OF HEALTH (SDOH)
IN A TYPICAL WEEK, HOW MANY TIMES DO YOU TALK ON THE PHONE WITH FAMILY, FRIENDS, OR NEIGHBORS?: NEVER
HOW HARD IS IT FOR YOU TO PAY FOR THE VERY BASICS LIKE FOOD, HOUSING, MEDICAL CARE, AND HEATING?: NOT VERY HARD
HOW OFTEN DO YOU HAVE SIX OR MORE DRINKS ON ONE OCCASION: NEVER
IN A TYPICAL WEEK, HOW MANY TIMES DO YOU TALK ON THE PHONE WITH FAMILY, FRIENDS, OR NEIGHBORS?: NEVER
DO YOU BELONG TO ANY CLUBS OR ORGANIZATIONS SUCH AS CHURCH GROUPS UNIONS, FRATERNAL OR ATHLETIC GROUPS, OR SCHOOL GROUPS?: YES
WITHIN THE PAST 12 MONTHS, YOU WORRIED THAT YOUR FOOD WOULD RUN OUT BEFORE YOU GOT THE MONEY TO BUY MORE: NEVER TRUE
DO YOU BELONG TO ANY CLUBS OR ORGANIZATIONS SUCH AS CHURCH GROUPS UNIONS, FRATERNAL OR ATHLETIC GROUPS, OR SCHOOL GROUPS?: YES
HOW OFTEN DO YOU ATTENT MEETINGS OF THE CLUB OR ORGANIZATION YOU BELONG TO?: MORE THAN 4 TIMES PER YEAR
HOW OFTEN DO YOU ATTEND CHURCH OR RELIGIOUS SERVICES?: MORE THAN 4 TIMES PER YEAR
IN THE PAST 12 MONTHS, HAS THE ELECTRIC, GAS, OIL, OR WATER COMPANY THREATENED TO SHUT OFF SERVICE IN YOUR HOME?: NO
HOW OFTEN DO YOU ATTENT MEETINGS OF THE CLUB OR ORGANIZATION YOU BELONG TO?: MORE THAN 4 TIMES PER YEAR
HOW OFTEN DO YOU HAVE A DRINK CONTAINING ALCOHOL: MONTHLY OR LESS
HOW OFTEN DO YOU ATTEND CHURCH OR RELIGIOUS SERVICES?: MORE THAN 4 TIMES PER YEAR
HOW OFTEN DO YOU GET TOGETHER WITH FRIENDS OR RELATIVES?: ONCE A WEEK
HOW OFTEN DO YOU GET TOGETHER WITH FRIENDS OR RELATIVES?: ONCE A WEEK
HOW MANY DRINKS CONTAINING ALCOHOL DO YOU HAVE ON A TYPICAL DAY WHEN YOU ARE DRINKING: 1 OR 2

## 2025-02-18 ASSESSMENT — LIFESTYLE VARIABLES
HOW OFTEN DO YOU HAVE A DRINK CONTAINING ALCOHOL: MONTHLY OR LESS
HOW MANY STANDARD DRINKS CONTAINING ALCOHOL DO YOU HAVE ON A TYPICAL DAY: 1 OR 2
SKIP TO QUESTIONS 9-10: 1
HOW OFTEN DO YOU HAVE SIX OR MORE DRINKS ON ONE OCCASION: NEVER
AUDIT-C TOTAL SCORE: 1

## 2025-02-19 ENCOUNTER — OFFICE VISIT (OUTPATIENT)
Dept: MEDICAL GROUP | Facility: PHYSICIAN GROUP | Age: 30
End: 2025-02-19
Payer: COMMERCIAL

## 2025-02-19 VITALS
TEMPERATURE: 98.3 F | HEART RATE: 76 BPM | OXYGEN SATURATION: 96 % | DIASTOLIC BLOOD PRESSURE: 70 MMHG | HEIGHT: 68 IN | SYSTOLIC BLOOD PRESSURE: 120 MMHG | WEIGHT: 193.3 LBS | BODY MASS INDEX: 29.3 KG/M2

## 2025-02-19 DIAGNOSIS — G89.29 CHRONIC PAIN OF LEFT ANKLE: ICD-10-CM

## 2025-02-19 DIAGNOSIS — G89.29 CHRONIC UPPER BACK PAIN: ICD-10-CM

## 2025-02-19 DIAGNOSIS — M54.9 CHRONIC UPPER BACK PAIN: ICD-10-CM

## 2025-02-19 DIAGNOSIS — M25.572 CHRONIC PAIN OF LEFT ANKLE: ICD-10-CM

## 2025-02-19 DIAGNOSIS — Z30.09 BIRTH CONTROL COUNSELING: ICD-10-CM

## 2025-02-19 PROBLEM — K35.80 APPENDICITIS, ACUTE: Status: RESOLVED | Noted: 2017-11-23 | Resolved: 2025-02-19

## 2025-02-19 PROCEDURE — 99204 OFFICE O/P NEW MOD 45 MIN: CPT | Performed by: PHYSICIAN ASSISTANT

## 2025-02-19 PROCEDURE — 3074F SYST BP LT 130 MM HG: CPT | Performed by: PHYSICIAN ASSISTANT

## 2025-02-19 PROCEDURE — 3078F DIAST BP <80 MM HG: CPT | Performed by: PHYSICIAN ASSISTANT

## 2025-02-19 ASSESSMENT — ENCOUNTER SYMPTOMS
SHORTNESS OF BREATH: 0
FEVER: 0
CHILLS: 0

## 2025-02-19 ASSESSMENT — PATIENT HEALTH QUESTIONNAIRE - PHQ9: CLINICAL INTERPRETATION OF PHQ2 SCORE: 0

## 2025-02-19 NOTE — ASSESSMENT & PLAN NOTE
Chronic, uncontrolled.  Upper back pain   Started around October 2024  No injury  No weight lifting  Seems to be worsening  No radiation  No OTC medications  Referring for PT

## 2025-02-19 NOTE — LETTER
[facility]  Manuela Christopher P.A.-C.  [address]  [place] [address]  Fax: [phone]   Authorization for Release/Disclosure of   Protected Health Information   Name: EDE JASMINE : 1995 SSN: [id number]   Address: [facility] [place] [address] Phone:    [phone] (home)    I authorize the entity listed below to release/disclose the PHI below to:   [facility]/Manuela Christopher P.A.-C. and Manuela Christopher P.A.-C.   Provider or Entity Name:  [facility]   Address   City, State, Zip   Phone:      Fax:     Reason for request: continuity of care   Information to be released:    [  ] LAST COLONOSCOPY,  including any PATH REPORT and follow-up  [  ] LAST FIT/COLOGUARD RESULT [  ] LAST DEXA  [  ] LAST MAMMOGRAM  [  ] LAST PAP  [  ] LAST LABS [  ] RETINA EXAM REPORT  [  ] IMMUNIZATION RECORDS  [  ] Release all information    Labs from       [  ] Check here and initial the line next to each item to release ALL health information INCLUDING  _____ Care and treatment for drug and / or alcohol abuse  _____ HIV testing, infection status, or AIDS  _____ Genetic Testing    DATES OF SERVICE OR TIME PERIOD TO BE DISCLOSED: _____________  I understand and acknowledge that:  * This Authorization may be revoked at any time by you in writing, except if your health information has already been used or disclosed.  * Your health information that will be used or disclosed as a result of you signing this authorization could be re-disclosed by the recipient. If this occurs, your re-disclosed health information may no longer be protected by State or Federal laws.  * You may refuse to sign this Authorization. Your refusal will not affect your ability to obtain treatment.  * This Authorization becomes effective upon signing and will  on (date) __________.      If no date is indicated, this Authorization will  one (1) year from the signature date.    Name: Ede  Judy  Signature: Date:   2/19/2025     PLEASE FAX REQUESTED RECORDS BACK TO: [phone]

## 2025-02-19 NOTE — ASSESSMENT & PLAN NOTE
Chronic, uncontrolled.  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

## 2025-02-19 NOTE — PROGRESS NOTES
"SUBJECTIVE:     CC: establish care; NEW PATIENT    HPI:   Ede presents today with the following:    ASSESSMENT & PLAN by Problem:     Problem   Chronic Pain of Left Ankle    Chronic, uncontrolled.  Referring to orthopedics per patient request.     Chronic Upper Back Pain    Chronic, uncontrolled.  Referring for PT.     Birth Control Counseling    Requesting urology referral for vasectomy evaluation.     Appendicitis, Acute (Resolved)       Had labs drawn by [facility] - was told he needed to donate blood.  Requesting records.    Return if symptoms worsen or fail to improve.        HPI:     Problem List Items Addressed This Visit       Birth control counseling    Relevant Orders    Referral to Urology    Chronic pain of left ankle    Chronic, uncontrolled.  Left ankle   Injury as a teenager  Pain most days  Worse when working  Works three 12 hour shift, worse by the end of the day  Evaluation by 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  Upper back pain   Started around October 2024  No injury  No weight lifting  Seems to be worsening  No radiation  No OTC medications  Referring for PT         Relevant Orders    Referral to Physical Therapy              ROS:  Review of Systems   Constitutional:  Negative for chills and fever.   Respiratory:  Negative for shortness of breath.    Cardiovascular:  Negative for chest pain.       OBJECTIVE:     Exam:  /70 (BP Location: Right arm, Patient Position: Sitting, BP Cuff Size: Adult)   Pulse 76   Temp 36.8 °C (98.3 °F) (Temporal)   Ht 1.727 m (5' 8\")   Wt 87.7 kg (193 lb 4.8 oz)   SpO2 96%   BMI 29.39 kg/m²  Body mass index is 29.39 kg/m².    Physical Exam  Vitals reviewed.   Constitutional:       General: He is not in acute distress.     Appearance: Normal " appearance.   HENT:      Head: Normocephalic.   Eyes:      Extraocular Movements: Extraocular movements intact.      Pupils: Pupils are equal, round, and reactive to light.   Cardiovascular:      Rate and Rhythm: Normal rate and regular rhythm.      Pulses: Normal pulses.      Heart sounds: No murmur heard.     No gallop.   Pulmonary:      Effort: Pulmonary effort is normal. No respiratory distress.      Breath sounds: No wheezing.   Abdominal:      Palpations: Abdomen is soft. There is no mass.      Tenderness: There is no abdominal tenderness.   Musculoskeletal:         General: No swelling.      Comments: Mild TTP in the paraspinous muscles of the thoracic spine, between the shoulder blades.  Full range of motion bilateral arms/shoulders.  No motor or sensory deficit noted. Does appear to have some decreased ROM in all planes of the left ankle.   Skin:     General: Skin is warm and dry.   Neurological:      General: No focal deficit present.      Mental Status: He is alert and oriented to person, place, and time.   Psychiatric:         Mood and Affect: Mood normal.         Behavior: Behavior normal.         Judgment: Judgment normal.           CHART REVIEW:     Labs:                        Please note that this dictation was created using voice recognition software. I have made every reasonable attempt to correct obvious errors, but I expect that there are errors of grammar and possibly content that I did not discover before finalizing the note.

## 2025-02-21 NOTE — Clinical Note
REFERRAL APPROVAL NOTICE         Sent on February 21, 2025                   Glen Kim  [address]  [place] [address]                   Dear Mr. Kim,    After a careful review of the medical information and benefit coverage, [facility] has processed your referral. See below for additional details.    If applicable, you must be actively enrolled with your insurance for coverage of the authorized service. If you have any questions regarding your coverage, please contact your insurance directly.    REFERRAL INFORMATION   Referral #:  [id number]  Referred-To Department    Referred-By Provider:  Urology    Manuela Christopher P.A.-C.   [facility]      [address]  [facility] [id number] [phone] [address]  [place] [address] [phone]    Referral Start Date:  02/19/2025  Referral End Date:   02/19/2026             SCHEDULING  If you do not already have an appointment, please call [phone] to make an appointment.     MORE INFORMATION  If you do not already have a [employer] account, sign up at: [email or web address]  You can access your medical information, make appointments, see lab results, billing information, and more.  If you have questions regarding this referral, please contact  the [facility] Referrals department at:             [phone]. Monday - Friday 8:00AM - 5:00PM.     Sincerely,    [facility]

## 2025-02-21 NOTE — Clinical Note
REFERRAL APPROVAL NOTICE         Sent on February 21, 2025                   Glen Kim  [address]  [facility] [address]                   Dear Mr. Kim,    After a careful review of the medical information and benefit coverage, [facility] has processed your referral. See below for additional details.    If applicable, you must be actively enrolled with your insurance for coverage of the authorized service. If you have any questions regarding your coverage, please contact your insurance directly.    REFERRAL INFORMATION   Referral #:  [id number]  Referred-To Department    Referred-By Provider:  Orthopedics    Manuela Christopher P.A.-C.   [facility]      [address]  [facility] [id number] [phone] [address] [place] [id number] [phone]    Referral Start Date:  02/19/2025  Referral End Date:   02/19/2026             SCHEDULING  If you do not already have an appointment, please call [phone] to make an appointment.     MORE INFORMATION  If you do not already have a [employer] account, sign up at: [email or web address]  You can access your medical information, make appointments, see lab results, billing information, and more.  If you have questions regarding this referral, please contact  the [facility] Referrals department at:             [phone]. Monday - Friday 8:00AM - 5:00PM.     Sincerely,    [facility]

## 2025-02-21 NOTE — Clinical Note
REFERRAL APPROVAL NOTICE         Sent on February 21, 2025                   Glen Kim  [address]  [place] [address]                   Dear Mr. Kim,    After a careful review of the medical information and benefit coverage, [facility] has processed your referral. See below for additional details.    If applicable, you must be actively enrolled with your insurance for coverage of the authorized service. If you have any questions regarding your coverage, please contact your insurance directly.    REFERRAL INFORMATION   Referral #:  [id number]  Referred-To Department    Referred-By Provider:  Physical Therapy    Manuela Christopher P.A.-C.   [facility] [facility]      [address]  [place] [address] [phone] [address]  [address]  [place] [address] [phone]    Referral Start Date:  02/19/2025  Referral End Date:   02/19/2026             SCHEDULING  If you do not already have an appointment, please call [phone] to make an appointment.     MORE INFORMATION  If you do not already have a [employer] account, sign up at: [email or web address]  You can access your medical information, make appointments, see lab results, billing information, and more.  If you have questions regarding this referral, please contact  the [facility] Referrals department at:             [phone]. Monday - Friday 8:00AM - 5:00PM.     Sincerely,    [facility]

## 2025-03-11 PROBLEM — M19.071 ARTHRITIS OF RIGHT SUBTALAR JOINT: Status: ACTIVE | Noted: 2025-03-11

## 2025-04-02 ENCOUNTER — OFFICE VISIT (OUTPATIENT)
Dept: UROLOGY | Facility: MEDICAL CENTER | Age: 30
End: 2025-04-02
Payer: COMMERCIAL

## 2025-04-02 DIAGNOSIS — Z30.09 VASECTOMY EVALUATION: ICD-10-CM

## 2025-04-02 PROCEDURE — 99203 OFFICE O/P NEW LOW 30 MIN: CPT | Performed by: STUDENT IN AN ORGANIZED HEALTH CARE EDUCATION/TRAINING PROGRAM

## 2025-04-02 RX ORDER — DIAZEPAM 10 MG/1
10 TABLET ORAL
Qty: 1 TABLET | Refills: 0 | Status: SHIPPED | OUTPATIENT
Start: 2025-04-02 | End: 2025-04-02

## 2025-04-02 NOTE — PROGRESS NOTES
Subjective  Ede Kim is a 29 y.o. male who presents today for evaluation of vasectomy.    He does not have children. He has discussed this at length with his partner and he would like to proceed with vasectomy as a permanent form of contraception. He does not have  history of prior  history or surgeries. He is not on anticoagulation.  He does not have a history of a bleeding disorder. He does not have scrotal/testicular pain.      Family History   Problem Relation Age of Onset    Diabetes Mother     Cancer Neg Hx     Stroke Neg Hx     Heart Disease Neg Hx     Hypertension Neg Hx     Hyperlipidemia Neg Hx        Social History     Socioeconomic History    Marital status:      Spouse name: Not on file    Number of children: Not on file    Years of education: Not on file    Highest education level: Associate degree: occupational, technical, or vocational program   Occupational History    Not on file   Tobacco Use    Smoking status: Never    Smokeless tobacco: Never   Vaping Use    Vaping status: Never Used   Substance and Sexual Activity    Alcohol use: Yes     Comment: social    Drug use: Never    Sexual activity: Yes     Partners: Female   Other Topics Concern    Not on file   Social History Narrative    Not on file     Social Drivers of Health     Financial Resource Strain: Low Risk  (2/18/2025)    Overall Financial Resource Strain (CARDIA)     Difficulty of Paying Living Expenses: Not very hard   Food Insecurity: No Food Insecurity (2/18/2025)    Hunger Vital Sign     Worried About Running Out of Food in the Last Year: Never true     Ran Out of Food in the Last Year: Never true   Transportation Needs: No Transportation Needs (2/18/2025)    PRAPARE - Transportation     Lack of Transportation (Medical): No     Lack of Transportation (Non-Medical): No   Physical Activity: Insufficiently Active (2/18/2025)    Exercise Vital Sign     Days of Exercise per Week: 3 days     Minutes of Exercise per  Session: 10 min   Stress: No Stress Concern Present (2/18/2025)    [employer] [place] [employer] - Occupational Stress Questionnaire     Feeling of Stress : Only a little   Social Connections: Moderately Integrated (2/18/2025)    Social Connection and Isolation Panel [NHANES]     Frequency of Communication with Friends and Family: Never     Frequency of Social Gatherings with Friends and Family: Once a week     Attends Latter day Services: More than 4 times per year     Active Member of Clubs or Organizations: Yes     Attends Club or Organization Meetings: More than 4 times per year     Marital Status:    Intimate Partner Violence: Not on file   Housing Stability: Low Risk  (2/18/2025)    Housing Stability Vital Sign     Unable to Pay for Housing in the Last Year: No     Number of Times Moved in the Last Year: 0     Homeless in the Last Year: No       Past Surgical History:   Procedure Laterality Date    APPENDECTOMY LAPAROSCOPIC N/A 11/24/2017    Procedure: APPENDECTOMY LAPAROSCOPIC;  Surgeon: Kimberly Mayfield M.D.;  Location: SURGERY [facility];  Service: Vascular       Past Medical History:   Diagnosis Date    Depression        No current outpatient medications on file.     No current facility-administered medications for this visit.       No Known Allergies    Objective  There were no vitals taken for this visit.  Physical Exam  Constitutional:       Appearance: Normal appearance.   HENT:      Head: Normocephalic and atraumatic.   Eyes:      Extraocular Movements: Extraocular movements intact.      Conjunctiva/sclera: Conjunctivae normal.   Pulmonary:      Effort: No respiratory distress.   Genitourinary:     Comments: Testes descended without lesion or masses, vas easily palpable bilaterally  Musculoskeletal:      Cervical back: Normal range of motion.   Skin:     General: Skin is warm and dry.   Neurological:      General: No focal deficit present.      Mental Status: He is alert.  "  Psychiatric:         Mood and Affect: Mood normal.         Labs:   CBC   Lab Results   Component Value Date/Time    WBC 8.4 01/24/2019 1225    RBC 5.95 01/24/2019 1225    HEMOGLOBIN 17.5 01/24/2019 1225    HEMATOCRIT 50.9 01/24/2019 1225    MCV 85.5 01/24/2019 1225    MCH 29.4 01/24/2019 1225    MCHC 34.4 01/24/2019 1225    RDW 38.2 01/24/2019 1225    MPV 8.5 (L) 01/24/2019 1225    LYMPHOCYTES 16.30 (L) 01/24/2019 1225    LYMPHS 1.37 01/24/2019 1225    MONOCYTES 3.90 01/24/2019 1225    MONOS 0.33 01/24/2019 1225    EOSINOPHILS 0.00 01/24/2019 1225    EOS 0.00 01/24/2019 1225    BASOPHILS 0.50 01/24/2019 1225    BASO 0.04 01/24/2019 1225    NRBC 0.00 01/24/2019 1225       BMP   Lab Results   Component Value Date/Time    SODIUM 137 01/24/2019 1225    POTASSIUM 3.8 01/24/2019 1225    CHLORIDE 105 01/24/2019 1225    CO2 21 01/24/2019 1225    GLUCOSE 105 (H) 01/24/2019 1225    BUN 11 01/24/2019 1225    CREATININE 1.08 01/24/2019 1225    CALCIUM 9.3 01/24/2019 1225           Imaging:     none    Assessment    Vasectomy was discussed in detail including that this is a form a permanent sterilization/contraception, which has potential complications of bleeding, infection, pain, post-vasectomy pain syndrome, and recanalization.  A vasectomy packet was given to the patient and he was given oral and written instructions regarding the need to have a negative post-vasectomy semen analysis prior to being declared \"infertile\", and the need for continued alternative forms of birth control until that time.      Plan    Problem List Items Addressed This Visit       Vasectomy evaluation     RTC for vasectomy, sent diazepam  "

## 2025-05-06 ENCOUNTER — TELEPHONE (OUTPATIENT)
Dept: UROLOGY | Facility: MEDICAL CENTER | Age: 30
End: 2025-05-06
Payer: COMMERCIAL

## 2025-06-26 ENCOUNTER — APPOINTMENT (OUTPATIENT)
Dept: UROLOGY | Facility: MEDICAL CENTER | Age: 30
End: 2025-06-26
Payer: COMMERCIAL

## 2025-06-26 DIAGNOSIS — Z30.09 VASECTOMY EVALUATION: Primary | ICD-10-CM

## 2025-06-26 RX ORDER — BUPIVACAINE HYDROCHLORIDE 5 MG/ML
20 INJECTION, SOLUTION EPIDURAL; INTRACAUDAL; PERINEURAL ONCE
Status: CANCELLED | OUTPATIENT
Start: 2025-06-26 | End: 2025-06-26

## 2025-06-26 RX ADMIN — Medication 20 ML: at 11:17

## 2025-06-26 NOTE — PROGRESS NOTES
Subjective  Ede Kim is a 30 y.o. male who presents today for vasectomy. He understands this is considered a permanent form of sterilization/contraception and wishes to proceed.    Family History   Problem Relation Age of Onset    Diabetes Mother     Cancer Neg Hx     Stroke Neg Hx     Heart Disease Neg Hx     Hypertension Neg Hx     Hyperlipidemia Neg Hx        Social History     Socioeconomic History    Marital status:      Spouse name: Not on file    Number of children: Not on file    Years of education: Not on file    Highest education level: Associate degree: occupational, technical, or vocational program   Occupational History    Not on file   Tobacco Use    Smoking status: Never    Smokeless tobacco: Never   Vaping Use    Vaping status: Never Used   Substance and Sexual Activity    Alcohol use: Yes     Comment: social    Drug use: Never    Sexual activity: Yes     Partners: Female   Other Topics Concern    Not on file   Social History Narrative    Not on file     Social Drivers of Health     Financial Resource Strain: Low Risk  (2/18/2025)    Overall Financial Resource Strain (CARDIA)     Difficulty of Paying Living Expenses: Not very hard   Food Insecurity: No Food Insecurity (2/18/2025)    Hunger Vital Sign     Worried About Running Out of Food in the Last Year: Never true     Ran Out of Food in the Last Year: Never true   Transportation Needs: No Transportation Needs (2/18/2025)    PRAPARE - Transportation     Lack of Transportation (Medical): No     Lack of Transportation (Non-Medical): No   Physical Activity: Insufficiently Active (2/18/2025)    Exercise Vital Sign     Days of Exercise per Week: 3 days     Minutes of Exercise per Session: 10 min   Stress: No Stress Concern Present (2/18/2025)    [employer] [place] [employer] - Occupational Stress Questionnaire     Feeling of Stress : Only a little   Social Connections: Moderately Integrated (2/18/2025)    Social  Connection and Isolation Panel [NHANES]     Frequency of Communication with Friends and Family: Never     Frequency of Social Gatherings with Friends and Family: Once a week     Attends Latter-day Services: More than 4 times per year     Active Member of Clubs or Organizations: Yes     Attends Club or Organization Meetings: More than 4 times per year     Marital Status:    Intimate Partner Violence: Not on file   Housing Stability: Low Risk  (2/18/2025)    Housing Stability Vital Sign     Unable to Pay for Housing in the Last Year: No     Number of Times Moved in the Last Year: 0     Homeless in the Last Year: No       Past Surgical History[1]    Past Medical History[2]    Current Medications[3]    Allergies[4]    Objective  There were no vitals taken for this visit.  Physical Exam  Constitutional:       Appearance: Normal appearance.   HENT:      Head: Normocephalic and atraumatic.   Eyes:      Extraocular Movements: Extraocular movements intact.      Conjunctiva/sclera: Conjunctivae normal.   Pulmonary:      Effort: No respiratory distress.   Musculoskeletal:      Cervical back: Normal range of motion.   Skin:     General: Skin is warm and dry.   Neurological:      General: No focal deficit present.      Mental Status: He is alert.   Psychiatric:         Mood and Affect: Mood normal.         Labs:     none  Procedure    SURGEON: Dr. Althea King    PREOPERATIVE DIAGNOSIS: Vasectomy status.     POSTOPERATIVE DIAGNOSIS: Vasectomy status.     PROCEDURE PERFORMED: Vasectomy.     ANESTHESIA: 1% Lidocaine plain, 10 mg Diazepam    BLOOD LOSS: 1cc    SPECIMENS REMOVED: None.     TUBES, LINES, AND DRAINS: None.     PROCEDURE FINDINGS:     1. Successful vasectomy.     DESCRIPTION OF PROCEDURE: Informed consent was obtained. The patient was taken back to the procedure room and placed on the table in a supine position. The patient was then prepped and draped in the usual sterile fashion. I identified the right vas  deferens. A cord block was performed  with 1% Lidocaine. I then isolated the right vas deferens and injected 5 mL of 1% lidocaine plain subcutaneously and deep towards the vas deferens itself. I then used the scalpel to make a 4 mm incision over the vas deferens. I then used the sharp vasectomy dissector to bluntly dissect on either side of the vas until it was mobilized enough to grasp with the ring clamp. I then brought this up through the scrotal incision and used the vasectomy dissectors to clear off the fascial tissue. Once the vas was appropriately skeletonized, I then clamped the midpoint of the vas deferens with the vasectomy dissector. The proximal end of the vas was clipped, cut, and cauterized. The proximal end was released. I then clipped the intervening dartos tissue to stagger the proximal and distal ends. The distal end was then clipped, cut and cauterized. A 0.5 cm segment of the vas deferens was removed. No active bleeding was seen. I then turned my attention to the left side and repeated the procedure as stated above. I then evaluated both sides for any signs of bleeding and none was seen. I then used a 3-0 chromic suture to close the skin incisions with a horizontal mattress suture. This concluded the procedure, the patient tolerated it well.      Assessment: Vasectomy Post-Procedure     Advil (Ibuprofen) and Tylenol (Acetaminophen) as needed for pain control every 6 hours. It is recommended that you alternate between these medications every 3 hours for the first 24 hours.     Apply ice as needed, 10 minutes on and 10 minutes off, with a barrier between your skin and the ice. Continue this for the first 48 hours     You may shower after 48 hours     Supportive underwear (briefs or boxer briefs, no boxers)     No lifting > 15 pound for 10 days     No sexual activity for 10 days     No strenuous activity or straddle activities (bicycles, motorcycles, riding , etc) for 10 days     You will  provide a semen sample in 3 months for semen analysis, you will be called with the result. You will need to bring this to the lab within one hour of collection.      Plan    1. Vasectomy evaluation    Other orders  - lidocaine (Xylocaine) 1 % injection 20 mL      RTC prn, will contact with results of semen analysis       [1]   Past Surgical History:  Procedure Laterality Date    APPENDECTOMY LAPAROSCOPIC N/A 11/24/2017    Procedure: APPENDECTOMY LAPAROSCOPIC;  Surgeon: Kimberly Mayfield M.D.;  Location: SURGERY [facility];  Service: Vascular   [2]   Past Medical History:  Diagnosis Date    Depression    [3]   No current outpatient medications on file.     Current Facility-Administered Medications   Medication Dose Route Frequency Provider Last Rate Last Admin    lidocaine (Xylocaine) 1 % injection 20 mL  20 mL Injection Once        [4] No Known Allergies

## (undated) DEVICE — HEAD HOLDER JUNIOR/ADULT

## (undated) DEVICE — NEPTUNE 4 PORT MANIFOLD - (20/PK)

## (undated) DEVICE — TUBING INSUFFLATION - (10/BX)

## (undated) DEVICE — GLOVE, LITE (PAIR)

## (undated) DEVICE — ELECTRODE 850 FOAM ADHESIVE - HYDROGEL RADIOTRNSPRNT (50/PK)

## (undated) DEVICE — CANNULA W/SEAL 5X100 Z-THRE - ADED KII (12/BX)

## (undated) DEVICE — DERMABOND ADVANCED - (12EA/BX)

## (undated) DEVICE — TROCAR LAPSCP 100MM 12MM NTHRD - (6/BX)

## (undated) DEVICE — STAPLE 45MM BLUE 4.5MM (12EA/BX)

## (undated) DEVICE — Device

## (undated) DEVICE — SEALER VESSEL HARMONIC ACE PLUS WITH ADVANCED HEMOSTASIS 36CM (1/EA)

## (undated) DEVICE — KIT ANESTHESIA W/CIRCUIT & 3/LT BAG W/FILTER (20EA/CA)

## (undated) DEVICE — SPONGE GAUZESTER. 2X2 4-PL - (2/PK 50PK/BX 30BX/CS)

## (undated) DEVICE — TUBE CONNECTING SUCTION - CLEAR PLASTIC STERILE 72 IN (50EA/CA)

## (undated) DEVICE — SET SUCTION/IRRIGATION WITH DISPOSABLE TIP (6/CA )PART #0250-070-520 IS A SUB

## (undated) DEVICE — SENSOR SPO2 NEO LNCS ADHESIVE (20/BX) SEE USER NOTES

## (undated) DEVICE — BAG RETRIEVAL 10ML (10EA/BX)

## (undated) DEVICE — CANISTER SUCTION RIGID RED 1500CC (40EA/CA)

## (undated) DEVICE — GLOVE BIOGEL PI INDICATOR SZ 7.0 SURGICAL PF LF - (50/BX 4BX/CA)

## (undated) DEVICE — GLOVE BIOGEL PI ULTRATOUCH SZ 7.0 SURGICAL PF LF- POWDER FREE (50/BX 4BX/CA)

## (undated) DEVICE — CANNULA W/SEAL12X100ZTHREAD - (12/BX)

## (undated) DEVICE — GUIDE TRACHE TUBE INTUBATING STYLET 5.0-10.0MM 14FR (20EA/PK)

## (undated) DEVICE — TRAY CATHETER FOLEY URINE METER W/STATLOCK 350ML (10EA/CA)

## (undated) DEVICE — ELECTRODE 5MM LHK LAPSCP STERILE DISP- MEGADYNE  (5/CA)

## (undated) DEVICE — SUCTION INSTRUMENT YANKAUER BULBOUS TIP W/O VENT (50EA/CA)

## (undated) DEVICE — SUTURE 4-0 MONOCRYL PLUS PS-2 - 27 INCH (36/BX)

## (undated) DEVICE — PROTECTOR ULNA NERVE - (36PR/CA)

## (undated) DEVICE — TROCAR 5X100 BLADED Z-THREAD - KII (6/BX)

## (undated) DEVICE — SUTURE GENERAL

## (undated) DEVICE — DRESSING TRANSPARENT FILM TEGADERM 2.375 X 2.75"  (100EA/BX)"

## (undated) DEVICE — STAPLER 45MM ARTICULATING - ENDO (3EA/BX)

## (undated) DEVICE — GLOVE BIOGEL SZ 7 SURGICAL PF LTX - (50PR/BX 4BX/CA)

## (undated) DEVICE — GLOVE 7.0 LF PF PROTEXIS (50PR/BX)

## (undated) DEVICE — SUTURE 0 VICRYL PLUS UR-6 - 27 INCH (36/BX)

## (undated) DEVICE — MASK ANESTHESIA ADULT  - (100/CA)

## (undated) DEVICE — HUMID-VENT HEAT AND MOISTURE EXCHANGE- (50/BX)

## (undated) DEVICE — STYLETTE 6FR INFANT STERILE SINGEL ALUMINUM SUNSLIP SEALED IN PVC SHEATH (20EA/BX)

## (undated) DEVICE — CHLORAPREP 26 ML APPLICATOR - ORANGE TINT(25/CA)